# Patient Record
Sex: FEMALE | Race: WHITE | Employment: UNEMPLOYED | URBAN - METROPOLITAN AREA
[De-identification: names, ages, dates, MRNs, and addresses within clinical notes are randomized per-mention and may not be internally consistent; named-entity substitution may affect disease eponyms.]

---
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
## 2020-05-04 LAB
ANTIBODY SCREEN, EXTERNAL: NEGATIVE
HBSAG, EXTERNAL: NEGATIVE
HIV, EXTERNAL: NON REACTIVE
RPR, EXTERNAL: NON REACTIVE
RUBELLA, EXTERNAL: NORMAL

## 2020-05-05 LAB
CHLAMYDIA, EXTERNAL: NEGATIVE
N. GONORRHEA, EXTERNAL: NEGATIVE

## 2020-08-31 ENCOUNTER — HOSPITAL ENCOUNTER (EMERGENCY)
Age: 20
Discharge: HOME | End: 2020-08-31
Attending: EMERGENCY MEDICINE | Admitting: OBSTETRICS & GYNECOLOGY
Payer: MEDICAID

## 2020-08-31 VITALS
TEMPERATURE: 98.3 F | HEART RATE: 98 BPM | RESPIRATION RATE: 20 BRPM | HEIGHT: 62 IN | SYSTOLIC BLOOD PRESSURE: 108 MMHG | OXYGEN SATURATION: 100 % | DIASTOLIC BLOOD PRESSURE: 75 MMHG | WEIGHT: 293 LBS | BODY MASS INDEX: 53.92 KG/M2

## 2020-08-31 DIAGNOSIS — O26.893 VAGINAL DISCHARGE DURING PREGNANCY IN THIRD TRIMESTER: ICD-10-CM

## 2020-08-31 DIAGNOSIS — N89.8 VAGINAL DISCHARGE DURING PREGNANCY IN THIRD TRIMESTER: ICD-10-CM

## 2020-08-31 DIAGNOSIS — O26.893 ABDOMINAL PAIN DURING PREGNANCY IN THIRD TRIMESTER: Primary | ICD-10-CM

## 2020-08-31 DIAGNOSIS — R10.9 ABDOMINAL PAIN DURING PREGNANCY IN THIRD TRIMESTER: Primary | ICD-10-CM

## 2020-08-31 LAB
A1 MICROGLOB PLACENTAL VAG QL: NEGATIVE
ALBUMIN SERPL-MCNC: 2.3 G/DL (ref 3.5–5)
ALBUMIN/GLOB SERPL: 0.5 {RATIO} (ref 1.1–2.2)
ALP SERPL-CCNC: 164 U/L (ref 45–117)
ALT SERPL-CCNC: 24 U/L (ref 12–78)
AMORPH CRY URNS QL MICRO: ABNORMAL
ANION GAP SERPL CALC-SCNC: 6 MMOL/L (ref 5–15)
APPEARANCE UR: ABNORMAL
AST SERPL-CCNC: 13 U/L (ref 15–37)
BACTERIA URNS QL MICRO: NEGATIVE /HPF
BILIRUB SERPL-MCNC: 0.3 MG/DL (ref 0.2–1)
BILIRUB UR QL: NEGATIVE
BUN SERPL-MCNC: 7 MG/DL (ref 6–20)
BUN/CREAT SERPL: 13 (ref 12–20)
CALCIUM SERPL-MCNC: 9.1 MG/DL (ref 8.5–10.1)
CHLORIDE SERPL-SCNC: 106 MMOL/L (ref 97–108)
CO2 SERPL-SCNC: 26 MMOL/L (ref 21–32)
COLOR UR: ABNORMAL
CONTROL LINE PRESENT?: NORMAL
CREAT SERPL-MCNC: 0.54 MG/DL (ref 0.55–1.02)
CREAT UR-MCNC: 87.2 MG/DL
EPITH CASTS URNS QL MICRO: ABNORMAL /LPF
ERYTHROCYTE [DISTWIDTH] IN BLOOD BY AUTOMATED COUNT: 13.2 % (ref 11.5–14.5)
EXPIRATION DATE: NORMAL
GLOBULIN SER CALC-MCNC: 5 G/DL (ref 2–4)
GLUCOSE SERPL-MCNC: 94 MG/DL (ref 65–100)
GLUCOSE UR STRIP.AUTO-MCNC: NEGATIVE MG/DL
HCT VFR BLD AUTO: 34 % (ref 35–47)
HGB BLD-MCNC: 10.9 G/DL (ref 11.5–16)
HGB UR QL STRIP: NEGATIVE
INTERNAL NEGATIVE CONTROL: NORMAL
KETONES UR QL STRIP.AUTO: NEGATIVE MG/DL
KIT LOT NO.: NORMAL
LEUKOCYTE ESTERASE UR QL STRIP.AUTO: NEGATIVE
MCH RBC QN AUTO: 26 PG (ref 26–34)
MCHC RBC AUTO-ENTMCNC: 32.1 G/DL (ref 30–36.5)
MCV RBC AUTO: 81 FL (ref 80–99)
MUCOUS THREADS URNS QL MICRO: ABNORMAL /LPF
NITRITE UR QL STRIP.AUTO: NEGATIVE
NRBC # BLD: 0 K/UL (ref 0–0.01)
NRBC BLD-RTO: 0 PER 100 WBC
PH UR STRIP: 6.5 [PH] (ref 5–8)
PLATELET # BLD AUTO: 324 K/UL (ref 150–400)
PMV BLD AUTO: 9.5 FL (ref 8.9–12.9)
POTASSIUM SERPL-SCNC: 3.7 MMOL/L (ref 3.5–5.1)
PROT SERPL-MCNC: 7.3 G/DL (ref 6.4–8.2)
PROT UR STRIP-MCNC: NEGATIVE MG/DL
PROT UR-MCNC: 15 MG/DL (ref 0–11.9)
PROT/CREAT UR-RTO: 0.2
RBC # BLD AUTO: 4.2 M/UL (ref 3.8–5.2)
RBC #/AREA URNS HPF: ABNORMAL /HPF (ref 0–5)
SODIUM SERPL-SCNC: 138 MMOL/L (ref 136–145)
SP GR UR REFRACTOMETRY: 1.02 (ref 1–1.03)
UA: UC IF INDICATED,UAUC: ABNORMAL
URATE SERPL-MCNC: 3 MG/DL (ref 2.6–6)
UROBILINOGEN UR QL STRIP.AUTO: 1 EU/DL (ref 0.2–1)
WBC # BLD AUTO: 15.9 K/UL (ref 3.6–11)
WBC URNS QL MICRO: ABNORMAL /HPF (ref 0–4)

## 2020-08-31 PROCEDURE — 84550 ASSAY OF BLOOD/URIC ACID: CPT

## 2020-08-31 PROCEDURE — 75810000275 HC EMERGENCY DEPT VISIT NO LEVEL OF CARE

## 2020-08-31 PROCEDURE — 81001 URINALYSIS AUTO W/SCOPE: CPT

## 2020-08-31 PROCEDURE — 59025 FETAL NON-STRESS TEST: CPT

## 2020-08-31 PROCEDURE — 85027 COMPLETE CBC AUTOMATED: CPT

## 2020-08-31 PROCEDURE — 36415 COLL VENOUS BLD VENIPUNCTURE: CPT

## 2020-08-31 PROCEDURE — 84156 ASSAY OF PROTEIN URINE: CPT

## 2020-08-31 PROCEDURE — 80053 COMPREHEN METABOLIC PANEL: CPT

## 2020-08-31 PROCEDURE — 84112 EVAL AMNIOTIC FLUID PROTEIN: CPT | Performed by: OBSTETRICS & GYNECOLOGY

## 2020-08-31 PROCEDURE — 99285 EMERGENCY DEPT VISIT HI MDM: CPT

## 2020-09-01 NOTE — PROGRESS NOTES
NST note  Indication: abd pain, vaginal d/c   FHR = 135 with moderate variability, no decels, + accels  toco = quiet  Time on the monitor = 30 min  Interpretation = Reactive for GA

## 2020-09-01 NOTE — DISCHARGE INSTRUCTIONS
Patient Education        Belly Pain in Pregnancy: Care Instructions  Your Care Instructions     When you're pregnant, any belly pain can be a worry. You may not want to call your doctor about every pain you have. But you don't want to miss something that is dangerous for you or your baby. Even if it feels familiar, belly pain can mean something new when you're pregnant. It's important to know when to call your doctor. It will also help to know how to care for yourself at home when your pain is not caused by anything harmful. · When belly pain is more severe or constant, see a doctor right away. · If you're sure your belly pain is a sign of labor, call your doctor. · When belly pain is brief, it's usually a normal part of pregnancy. It might be related to changes in the growing uterus. Or it could be the stretching of ligaments called round ligaments. These ligaments help support the uterus. Round ligament pain can be on either side of your belly. It can also be felt in your hips or groin. Follow-up care is a key part of your treatment and safety. Be sure to make and go to all appointments, and call your doctor if you are having problems. It's also a good idea to know your test results and keep a list of the medicines you take. How can you tell if belly pain is a sign of labor? When belly pain is caused by labor, it can feel like mild or menstrual-like cramps in your lower belly. These cramps are probably contractions. They can happen in your second or third trimester. You may also have:  · A steady, dull ache in your lower back, pelvis, or thighs. · A feeling of pressure in your pelvis or lower belly. · Changes in your vaginal discharge or a sudden release of fluid from the vagina. If you think you are in labor, call your doctor. How can you care for yourself at home? When belly pain is mild and is not a symptom of labor:  · Rest until you feel better. · Take a warm bath.   · Think about what you drink and eat:  ? Drink plenty of fluids. Choose water and other caffeine-free clear liquids until you feel better. ? Try eating small, frequent meals. If your stomach is upset, try bland, low-fat foods like plain rice, broiled chicken, toast, and yogurt. · Think about how you move if you are having brief pains from stretching of the round ligaments. ? Try gentle stretching. ? Move a little more slowly when turning in bed or getting up from a chair, so those ligaments don't stretch quickly. ? Lean forward a bit if you think you are going to cough or sneeze. When should you call for help? Call 911 anytime you think you may need emergency care. For example, call if:  · You have sudden, severe pain in your belly. · You have severe vaginal bleeding. · You passed out (lost consciousness). · You have a seizure. Call your doctor now or seek immediate medical care if:  · You have new or worse belly pain or cramping. · You have any vaginal bleeding. · You have a fever. · You have symptoms of preeclampsia, such as:  ? Sudden swelling of your face, hands, or feet. ? New vision problems (such as dimness, blurring, or seeing spots). ? A severe headache. · You think that you may be in labor. This means that you've had at least 8 contractions within 1 hour or at least 4 contractions within 20 minutes, even after you change your position and drink fluids. · You have symptoms of a urinary tract infection. These may include:  ? Pain or burning when you urinate. ? A frequent need to urinate without being able to pass much urine. ? Pain in the flank, which is just below the rib cage and above the waist on either side of the back. ? Blood in your urine. Watch closely for changes in your health, and be sure to contact your doctor if you are worried about your or your baby's health. Where can you learn more?   Go to http://davin-alva.info/  Enter B275 in the search box to learn more about \"Belly Pain in Pregnancy: Care Instructions. \"  Current as of: 2020               Content Version: 12.5  © 1001-8785 [employer]. Care instructions adapted under license by [employer] (which disclaims liability or warranty for this information). If you have questions about a medical condition or this instruction, always ask your healthcare professional. [place] [address] any warranty or liability for your use of this information. Patient Education        Weeks 30 to 28 of Your Pregnancy: Care Instructions  Your Care Instructions    You have made it to the final months of your pregnancy. By now, your baby is really starting to look like a baby, with hair and plump skin. As you enter the final weeks of pregnancy, the reality of having a baby may start to set in. This is the time to settle on a name, get your household in order, set up a safe nursery, and find quality  if needed. Doing these things in advance will allow you to focus on caring for and enjoying your new baby. You may also want to have a tour of your hospital's labor and delivery unit to get a better idea of what to expect while you are in the hospital.  During these last months, it is very important to take good care of yourself and pay attention to what your body needs. If your doctor says it is okay for you to work, don't push yourself too hard. Use the tips provided in this care sheet to ease heartburn and care for varicose veins. If you haven't already had the Tdap shot during this pregnancy, talk to your doctor about getting it. It will help protect your  against pertussis infection. Follow-up care is a key part of your treatment and safety. Be sure to make and go to all appointments, and call your doctor if you are having problems. It's also a good idea to know your test results and keep a list of the medicines you take. How can you care for yourself at home?   Pay attention to your baby's movements  · You should feel your baby move several times every day. · Your baby now turns less, and kicks and jabs more. · Your baby sleeps 20 to 45 minutes at a time and is more active at certain times of day. · If your doctor wants you to count your baby's kicks:  ? Empty your bladder, and lie on your side or relax in a comfortable chair. ? Write down your start time. ? Pay attention only to your baby's movements. Count any movement except hiccups. ? After you have counted 10 movements, write down your stop time. ? Write down how many minutes it took for your baby to move 10 times. ? If an hour goes by and you have not recorded 10 movements, have something to eat or drink and then count for another hour. If you do not record 10 movements in either hour, call your doctor. Ease heartburn  · Eat small, frequent meals. · Do not eat chocolate, peppermint, or very spicy foods. Avoid drinks with caffeine, such as coffee, tea, and sodas. · Avoid bending over or lying down after meals. · Talk a short walk after you eat. · If heartburn is a problem at night, do not eat for 2 hours before bedtime. · Take antacids like Mylanta, Maalox, Rolaids, or Tums. Do not take antacids that have sodium bicarbonate. Care for varicose veins  · Varicose veins are blood vessels that stretch out with the extra blood during pregnancy. Your legs may ache or throb. Most varicose veins will go away after the birth. · Avoid standing for long periods of time. Sit with your legs crossed at the ankles, not the knees. · Sit with your feet propped up. · Avoid tight clothing or stockings. Wear support hose. · Exercise regularly. Try walking for at least 30 minutes a day. Where can you learn more? Go to http://www.gray.com/  Enter X471 in the search box to learn more about \"Weeks 30 to 32 of Your Pregnancy: Care Instructions. \"  Current as of: May 29, 2019Content Version: 12.4  © 5916-5620 [employer]. Care instructions adapted under license by [employer] (which disclaims liability or warranty for this information). If you have questions about a medical condition or this instruction, always ask your healthcare professional. [address] any warranty or liability for your use of this information.

## 2020-09-01 NOTE — H&P
CC: Abd pain, vaginal d/c    HPI:  This is a 21yo morbidly obese  @ 30 weeks 1 day who presents with c/o diffuse abdominal pain and watery vaginal discharge    No Known Allergies  No current facility-administered medications on file prior to encounter. Current Outpatient Medications on File Prior to Encounter   Medication Sig Dispense Refill    acetaminophen (TYLENOL) 325 mg tablet Take 650 mg by mouth every four (4) hours as needed for Pain.  PNV66-Iron Fumarate-FA-DSS-DHA 26-1.2- mg cap Take  by mouth.  ibuprofen (MOTRIN) 800 mg tablet Take 1 Tab by mouth every eight (8) hours as needed for Pain. 40 Tab 5    oxyCODONE-acetaminophen (PERCOCET) 5-325 mg per tablet Take 1 Tab by mouth every four (4) hours as needed for Pain. Max Daily Amount: 6 Tabs. 20 Tab 0     Past Medical History:   Diagnosis Date    Psychiatric problem     anxiety, depression, PTSD     Past Surgical History:   Procedure Laterality Date     DELIVERY ONLY      HX  SECTION  2017     OB History        3    Para   1    Term   1            AB   1    Living   1       SAB   1    TAB        Ectopic        Molar        Multiple   0    Live Births   1              Social History     Tobacco Use    Smoking status: Never Smoker    Smokeless tobacco: Never Used   Substance Use Topics    Alcohol use: No    Drug use: No     Family History   Problem Relation Age of Onset    Diabetes Mother     Hypertension Mother     Hypertension Father      Review of Systems   Constitutional: Positive for malaise/fatigue. Negative for chills and fever. HENT: Negative for nosebleeds. Eyes: Negative for blurred vision and double vision. Respiratory: Negative for cough, shortness of breath and wheezing. Cardiovascular: Negative for chest pain, palpitations and leg swelling. Gastrointestinal: Positive for abdominal pain. Negative for diarrhea, heartburn, nausea and vomiting.    Genitourinary: Negative for dysuria and frequency. Musculoskeletal: Negative for joint pain and myalgias. Skin: Negative for itching and rash. Neurological: Positive for dizziness. Negative for headaches. Endo/Heme/Allergies: Does not bruise/bleed easily.      Visit Vitals  BP (!) 151/98 (BP 1 Location: Left arm, BP Patient Position: At rest)   Pulse 68   Temp 98.5 °F (36.9 °C)   Resp 20   Ht 5' 2\" (1.575 m)   Wt 134.4 kg (296 lb 4.8 oz)   SpO2 99%   BMI 54.19 kg/m²     BP: (122-151)/(59-98)     Physical Examination:   General appearance - alert, well appearing, and in no distress, oriented to person, place, and time and normal appearing weight  Mental status - normal mood, behavior, speech, dress, motor activity, and thought processes  Neck - supple, no significant adenopathy  Chest - CTA  Heart - RRR  Abdomen - soft, nontender, nondistended, no masses or organomegaly  Pelvic -   VULVA: normal appearing vulva with no masses, tenderness or lesions,   VAGINA: normal appearing vagina with normal color and discharge, no lesions,   CERVIX: normal appearing cervix without discharge or lesions, L/C  UTERUS: uterus is normal size, shape, consistency and nontender  Neurological - alert, oriented, normal speech, no focal findings or movement disorder noted, cranial nerves II through XII intact, motor and sensory grossly normal bilaterally  Musculoskeletal - full range of motion without pain  Extremities - no pedal edema noted  Skin - normal coloration and turgor, no rashes, no suspicious skin lesions noted    FHR = 135 with moderate variability, no decels, + accels  toco = quiet    Amnisure = negative    A/P 24 yo P1 @ 30w1d with abd pain, vaginal d/c  Reassuring FHT  Reactive NST  No signs of  labor  No signs of PPROM  PIH labs 2dry to elevated initial BP  Dispo based on lab results    Addendum:    Labs wnl  Serial BPs wnl  D/c with OP f/u this week  Precautions reviewed

## 2020-09-01 NOTE — PROGRESS NOTES
9:08 PM  Pt arrived from the ED with complaints of lower left abdominal burning sensation and cramping for the past 3 days and then a sudden pool of fluid around 0900 this morning with some leaking throughout the day since then. Pt also exhibits left flank pain upon touch and reports having diarrhea for the past 3 days. . 30 and 1/7 weeks. Denies vaginal bleeding and reports positive fetal movement. Reports sexual intercourse last night. Pt with recurrent UTIs during this pregnancy. Previous c/s x 1. Will inform Dr Susana Saleem of pt.    9:13 PM  EFM x 2 applied. 9:37 PM  AmniSure test started. 9:47 PM  AmniSure result negative. 9:53 PM  Dr Susana Saleem in with pt. Strip reviewed. Pt removed from EFM by Dr Susana Saleem. Strip reactive and no contractions observed. 9:55 PM  SVE performed by Dr Susana Saleem. Cervix closed/thick/high. 10:53 PM  Lab results reviewed by Dr Susana Saleem. Will discharge pt home. 11:10 PM  Discharge instructions reviewed with pt and her significant other. Pt instructed to call and follow up with her OB provider this week and to inform them of all of her concerns. Pt also instructed to drink more water. Labor precautions reviewed. Pt verbalizes understanding. Summary signed and witnessed. Pt discharged home with her significant other at this time.

## 2020-09-01 NOTE — ED PROVIDER NOTES
EMERGENCY DEPARTMENT HISTORY AND PHYSICAL EXAM      Date: 8/31/2020  Patient Name: Candido Weeks    History of Presenting Illness     Chief Complaint   Patient presents with    Abdominal Pain     Pt reports cramping throughout evening, states she noticed clear discharge this morning, states she felt increased leaking of fluid this evening. Also reports \"burning\" sensation in L side. History Provided By: Patient    HPI: Candido Weeks, 23 y.o. female presents to the ED with cc of abdominal pain and vaginal discharge. Patient states that her pain started last night. It is a burning and cramping sensation which involves the lower abdomen. Pain is currently 6 out of 10 in severity. She is a G3, P1 who is 30 weeks pregnant. She also states that she had some vaginal discharge this evening. She is not sure whether her water broke. It was a clear drainage. She denies nausea, vomiting, chest pain, cough, fever or chills. She denies dysuria or change in bowel habits. She also denies shortness of breath. There are no other complaints, changes, or physical findings at this time. PCP: Unknown, Provider    No current facility-administered medications on file prior to encounter. Current Outpatient Medications on File Prior to Encounter   Medication Sig Dispense Refill    ibuprofen (MOTRIN) 800 mg tablet Take 1 Tab by mouth every eight (8) hours as needed for Pain. 40 Tab 5    oxyCODONE-acetaminophen (PERCOCET) 5-325 mg per tablet Take 1 Tab by mouth every four (4) hours as needed for Pain. Max Daily Amount: 6 Tabs. 20 Tab 0    acetaminophen (TYLENOL) 325 mg tablet Take 650 mg by mouth every four (4) hours as needed for Pain.  PNV66-Iron Fumarate-FA-DSS-DHA 26-1.2- mg cap Take  by mouth.          Past History     Past Medical History:  Past Medical History:   Diagnosis Date    Psychiatric problem     anxiety, depression, PTSD       Past Surgical History:  No past surgical history on file.    Family History:  Family History   Problem Relation Age of Onset    Diabetes Mother     Hypertension Mother     Hypertension Father        Social History:  Social History     Tobacco Use    Smoking status: Never Smoker    Smokeless tobacco: Never Used   Substance Use Topics    Alcohol use: No    Drug use: No       Allergies:  No Known Allergies      Review of Systems   Review of Systems   Constitutional: Negative for fever. HENT: Negative for congestion. Eyes: Negative. Respiratory: Negative for shortness of breath. Cardiovascular: Negative for chest pain. Gastrointestinal: Positive for abdominal pain. Endocrine: Negative for heat intolerance. Genitourinary: Positive for vaginal discharge. Musculoskeletal: Negative for neck pain. Skin: Negative for rash. Allergic/Immunologic: Negative for immunocompromised state. Neurological: Negative for dizziness. Hematological: Does not bruise/bleed easily. Psychiatric/Behavioral: Negative. All other systems reviewed and are negative. Physical Exam   Physical Exam  Vitals signs and nursing note reviewed. Constitutional:       General: She is not in acute distress. Appearance: She is well-developed. HENT:      Head: Normocephalic. Neck:      Musculoskeletal: Normal range of motion and neck supple. Cardiovascular:      Rate and Rhythm: Normal rate and regular rhythm. Heart sounds: Normal heart sounds. Pulmonary:      Effort: Pulmonary effort is normal.      Breath sounds: Normal breath sounds. Abdominal:      General: Bowel sounds are normal.      Palpations: Abdomen is soft. Tenderness: There is abdominal tenderness in the left lower quadrant. Comments: Gravid   Musculoskeletal: Normal range of motion. Skin:     General: Skin is warm and dry. Neurological:      General: No focal deficit present. Mental Status: She is alert.    Psychiatric:         Mood and Affect: Mood normal. Behavior: Behavior normal.         Diagnostic Study Results     Labs -   No results found for this or any previous visit (from the past 12 hour(s)). Radiologic Studies -   No orders to display     CT Results  (Last 48 hours)    None        CXR Results  (Last 48 hours)    None          Medical Decision Making   I am the first provider for this patient. I reviewed the vital signs, available nursing notes, past medical history, past surgical history, family history and social history. Vital Signs-Reviewed the patient's vital signs. Patient Vitals for the past 12 hrs:   Temp Pulse Resp BP SpO2   08/31/20 2100 98.5 °F (36.9 °C) 68 20 (!) 151/98 98 %       . Records Reviewed: Nursing Notes, Old Medical Records and Previous Laboratory Studies    Provider Notes (Medical Decision Making): Abdominal pain in pregnancy, Castro Connell, ruptured membranes UTI    ED Course:   Initial assessment performed. The patients presenting problems have been discussed, and they are in agreement with the care plan formulated and outlined with them. I have encouraged them to ask questions as they arise throughout their visit. Progress note:    Patient is being sent to labor and delivery             Critical Care Time:   0    Disposition:  L & D    DISCHARGE PLAN:  1. Current Discharge Medication List        2. Follow-up Information    None       3. Return to ED if worse     Diagnosis     Clinical Impression:   1. Abdominal pain during pregnancy in third trimester    2. Vaginal discharge during pregnancy in third trimester        Attestations:    Salome Walker MD    Please note that this dictation was completed with [email or web address], the computer voice recognition software. Quite often unanticipated grammatical, syntax, homophones, and other interpretive errors are inadvertently transcribed by the computer software. Please disregard these errors. Please excuse any errors that have escaped final proofreading.   Thank you.

## 2020-09-21 ENCOUNTER — HOSPITAL ENCOUNTER (OUTPATIENT)
Age: 20
Setting detail: OBSERVATION
Discharge: HOME | End: 2020-09-22
Attending: OBSTETRICS & GYNECOLOGY | Admitting: SPECIALIST
Payer: MEDICAID

## 2020-09-21 PROBLEM — O13.9 PREGNANCY INDUCED HYPERTENSION: Status: ACTIVE | Noted: 2020-09-21

## 2020-09-21 LAB
ABO + RH BLD: NORMAL
ALBUMIN SERPL-MCNC: 2.3 G/DL (ref 3.5–5)
ALBUMIN/GLOB SERPL: 0.5 {RATIO} (ref 1.1–2.2)
ALP SERPL-CCNC: 191 U/L (ref 45–117)
ALT SERPL-CCNC: 32 U/L (ref 12–78)
ANION GAP SERPL CALC-SCNC: 7 MMOL/L (ref 5–15)
AST SERPL-CCNC: 16 U/L (ref 15–37)
BASOPHILS # BLD: 0.1 K/UL (ref 0–0.1)
BASOPHILS NFR BLD: 0 % (ref 0–1)
BILIRUB SERPL-MCNC: 0.4 MG/DL (ref 0.2–1)
BLOOD GROUP ANTIBODIES SERPL: NORMAL
BUN SERPL-MCNC: 6 MG/DL (ref 6–20)
BUN/CREAT SERPL: 10 (ref 12–20)
CALCIUM SERPL-MCNC: 9 MG/DL (ref 8.5–10.1)
CHLORIDE SERPL-SCNC: 106 MMOL/L (ref 97–108)
CO2 SERPL-SCNC: 24 MMOL/L (ref 21–32)
CREAT SERPL-MCNC: 0.59 MG/DL (ref 0.55–1.02)
CREAT UR-MCNC: 52.6 MG/DL
DIFFERENTIAL METHOD BLD: ABNORMAL
EOSINOPHIL # BLD: 0.1 K/UL (ref 0–0.4)
EOSINOPHIL NFR BLD: 1 % (ref 0–7)
ERYTHROCYTE [DISTWIDTH] IN BLOOD BY AUTOMATED COUNT: 13.2 % (ref 11.5–14.5)
GLOBULIN SER CALC-MCNC: 5 G/DL (ref 2–4)
GLUCOSE SERPL-MCNC: 79 MG/DL (ref 65–100)
HCT VFR BLD AUTO: 32.6 % (ref 35–47)
HGB BLD-MCNC: 10.4 G/DL (ref 11.5–16)
IMM GRANULOCYTES # BLD AUTO: 0.3 K/UL (ref 0–0.04)
IMM GRANULOCYTES NFR BLD AUTO: 2 % (ref 0–0.5)
LYMPHOCYTES # BLD: 2.2 K/UL (ref 0.8–3.5)
LYMPHOCYTES NFR BLD: 15 % (ref 12–49)
MCH RBC QN AUTO: 24.9 PG (ref 26–34)
MCHC RBC AUTO-ENTMCNC: 31.9 G/DL (ref 30–36.5)
MCV RBC AUTO: 78 FL (ref 80–99)
MONOCYTES # BLD: 1.1 K/UL (ref 0–1)
MONOCYTES NFR BLD: 8 % (ref 5–13)
NEUTS SEG # BLD: 10.7 K/UL (ref 1.8–8)
NEUTS SEG NFR BLD: 74 % (ref 32–75)
NRBC # BLD: 0 K/UL (ref 0–0.01)
NRBC BLD-RTO: 0 PER 100 WBC
PLATELET # BLD AUTO: 295 K/UL (ref 150–400)
PMV BLD AUTO: 9.7 FL (ref 8.9–12.9)
POTASSIUM SERPL-SCNC: 3.9 MMOL/L (ref 3.5–5.1)
PROT SERPL-MCNC: 7.3 G/DL (ref 6.4–8.2)
PROT UR-MCNC: 13 MG/DL (ref 0–11.9)
PROT/CREAT UR-RTO: 0.2
RBC # BLD AUTO: 4.18 M/UL (ref 3.8–5.2)
SODIUM SERPL-SCNC: 137 MMOL/L (ref 136–145)
SPECIMEN EXP DATE BLD: NORMAL
URATE SERPL-MCNC: 3.2 MG/DL (ref 2.6–6)
WBC # BLD AUTO: 14.4 K/UL (ref 3.6–11)

## 2020-09-21 PROCEDURE — 74011250637 HC RX REV CODE- 250/637: Performed by: ADVANCED PRACTICE MIDWIFE

## 2020-09-21 PROCEDURE — 86900 BLOOD TYPING SEROLOGIC ABO: CPT

## 2020-09-21 PROCEDURE — 99218 HC RM OBSERVATION: CPT

## 2020-09-21 PROCEDURE — 84156 ASSAY OF PROTEIN URINE: CPT

## 2020-09-21 PROCEDURE — 84550 ASSAY OF BLOOD/URIC ACID: CPT

## 2020-09-21 PROCEDURE — 80053 COMPREHEN METABOLIC PANEL: CPT

## 2020-09-21 PROCEDURE — 36415 COLL VENOUS BLD VENIPUNCTURE: CPT

## 2020-09-21 PROCEDURE — 59025 FETAL NON-STRESS TEST: CPT

## 2020-09-21 PROCEDURE — 85025 COMPLETE CBC W/AUTO DIFF WBC: CPT

## 2020-09-21 RX ORDER — CALCIUM CARBONATE 200(500)MG
200 TABLET,CHEWABLE ORAL
Status: DISCONTINUED | OUTPATIENT
Start: 2020-09-21 | End: 2020-09-22 | Stop reason: HOSPADM

## 2020-09-21 RX ORDER — ZOLPIDEM TARTRATE 5 MG/1
5 TABLET ORAL
Status: DISCONTINUED | OUTPATIENT
Start: 2020-09-21 | End: 2020-09-22 | Stop reason: HOSPADM

## 2020-09-21 RX ORDER — SODIUM CHLORIDE 0.9 % (FLUSH) 0.9 %
5-40 SYRINGE (ML) INJECTION EVERY 8 HOURS
Status: DISCONTINUED | OUTPATIENT
Start: 2020-09-21 | End: 2020-09-22 | Stop reason: HOSPADM

## 2020-09-21 RX ORDER — ACETAMINOPHEN 325 MG/1
650 TABLET ORAL
Status: DISCONTINUED | OUTPATIENT
Start: 2020-09-21 | End: 2020-09-22 | Stop reason: HOSPADM

## 2020-09-21 RX ORDER — SODIUM CHLORIDE 0.9 % (FLUSH) 0.9 %
SYRINGE (ML) INJECTION
Status: COMPLETED
Start: 2020-09-21 | End: 2020-09-21

## 2020-09-21 RX ORDER — SWAB
1 SWAB, NON-MEDICATED MISCELLANEOUS DAILY
Status: DISCONTINUED | OUTPATIENT
Start: 2020-09-22 | End: 2020-09-22 | Stop reason: HOSPADM

## 2020-09-21 RX ORDER — SODIUM CHLORIDE 0.9 % (FLUSH) 0.9 %
5-40 SYRINGE (ML) INJECTION AS NEEDED
Status: DISCONTINUED | OUTPATIENT
Start: 2020-09-21 | End: 2020-09-22 | Stop reason: HOSPADM

## 2020-09-21 RX ADMIN — Medication 10 ML: at 21:05

## 2020-09-21 RX ADMIN — ACETAMINOPHEN 650 MG: 325 TABLET ORAL at 21:09

## 2020-09-21 NOTE — PROGRESS NOTES
7:15 PM  Bedside shift change report given to Michaela Guillermo RN (oncoming nurse) by Adriana Martell RN (offgoing nurse). Report included the following information SBAR, Kardex, Intake/Output, MAR and Recent Results.

## 2020-09-21 NOTE — H&P
History & Physical    Name: Flaquita Klein MRN: [id number]  SSN: [id number]    YOB: 2000  Age: 23 y.o. Sex: female      Subjective:     Reason for Admission:  Pregnancy and PIH    History of Present Illness: Ms. Minerva Polanco is a 23 y.o.  female with an estimated gestational age of 30w4d with Estimated Date of Delivery: 20. Patient complains of moderate headache  and RUQ pain since this afternoon. She presented to the office today and had elevated blood pressures. Pregnancy has been complicated by morbid obesity. Patient denies visual disturbances. OB History    Para Term  AB Living   3 1 1   1 1   SAB TAB Ectopic Molar Multiple Live Births   1       0 1      # Outcome Date GA Lbr Ernst/2nd Weight Sex Delivery Anes PTL Lv   3 Current            2 Term 17 39w3d  4.12 kg M CS-LTranv SPINAL AN N JOHN PAUL   1 SAB 2016             Past Medical History:   Diagnosis Date    Psychiatric problem     anxiety, depression, PTSD     Past Surgical History:   Procedure Laterality Date     DELIVERY ONLY      HX  SECTION  2017     Social History     Occupational History    Not on file   Tobacco Use    Smoking status: Never Smoker    Smokeless tobacco: Never Used   Substance and Sexual Activity    Alcohol use: No    Drug use: No    Sexual activity: Yes     Partners: Male      Family History   Problem Relation Age of Onset    Diabetes Mother     Hypertension Mother     Hypertension Father        No Known Allergies  Prior to Admission medications    Medication Sig Start Date End Date Taking? Authorizing Provider   acetaminophen (TYLENOL) 325 mg tablet Take 650 mg by mouth every four (4) hours as needed for Pain. Provider, Historical   PNV66-Iron Fumarate-FA-DSS-DHA 26-1.2- mg cap Take  by mouth. Provider, Historical        Review of Systems:  Pertinent items are noted in the History of Present Illness.      Objective:     Vitals:    Vitals:    20 1537 20 1551 20 1552 20 1600   BP: 119/73  119/68    Pulse: (!) 120  (!) 118    Resp: 20      Temp: 98 °F (36.7 °C)      SpO2: 97% 96% 96%    Weight:    136.1 kg (300 lb)   Height:    5' 2\" (1.575 m)      Temp (24hrs), Av °F (36.7 °C), Min:98 °F (36.7 °C), Max:98 °F (36.7 °C)    BP  Min: 119/68  Max: 119/68     Physical Exam:  Patient without distress. Heart: Regular rate and rhythm or S1S2 present  Lung: clear to auscultation throughout lung fields, no wheezes, no rales, no rhonchi and normal respiratory effort  Abdomen: soft, nontender  Fundus: soft and non tender  Lower Extremities:  - Edema 1+     Membranes:  Intact  Uterine Activity:  Irregular cx  Fetal Heart Rate:  Reactive       Lab/Data Review:  Recent Results (from the past 24 hour(s))   CBC WITH AUTOMATED DIFF    Collection Time: 20  3:48 PM   Result Value Ref Range    WBC 14.4 (H) 3.6 - 11.0 K/uL    RBC 4.18 3.80 - 5.20 M/uL    HGB 10.4 (L) 11.5 - 16.0 g/dL    HCT 32.6 (L) 35.0 - 47.0 %    MCV 78.0 (L) 80.0 - 99.0 FL    MCH 24.9 (L) 26.0 - 34.0 PG    MCHC 31.9 30.0 - 36.5 g/dL    RDW 13.2 11.5 - 14.5 %    PLATELET 077 701 - 370 K/uL    MPV 9.7 8.9 - 12.9 FL    NRBC 0.0 0  WBC    ABSOLUTE NRBC 0.00 0.00 - 0.01 K/uL    NEUTROPHILS 74 32 - 75 %    LYMPHOCYTES 15 12 - 49 %    MONOCYTES 8 5 - 13 %    EOSINOPHILS 1 0 - 7 %    BASOPHILS 0 0 - 1 %    IMMATURE GRANULOCYTES 2 (H) 0.0 - 0.5 %    ABS. NEUTROPHILS 10.7 (H) 1.8 - 8.0 K/UL    ABS. LYMPHOCYTES 2.2 0.8 - 3.5 K/UL    ABS. MONOCYTES 1.1 (H) 0.0 - 1.0 K/UL    ABS. EOSINOPHILS 0.1 0.0 - 0.4 K/UL    ABS. BASOPHILS 0.1 0.0 - 0.1 K/UL    ABS. IMM.  GRANS. 0.3 (H) 0.00 - 0.04 K/UL    DF AUTOMATED     METABOLIC PANEL, COMPREHENSIVE    Collection Time: 20  3:48 PM   Result Value Ref Range    Sodium 137 136 - 145 mmol/L    Potassium 3.9 3.5 - 5.1 mmol/L    Chloride 106 97 - 108 mmol/L    CO2 24 21 - 32 mmol/L    Anion gap 7 5 - 15 mmol/L    Glucose 79 65 - 100 mg/dL    BUN 6 6 - 20 MG/DL    Creatinine 0.59 0.55 - 1.02 MG/DL    BUN/Creatinine ratio 10 (L) 12 - 20      GFR est AA >60 >60 ml/min/1.73m2    GFR est non-AA >60 >60 ml/min/1.73m2    Calcium 9.0 8.5 - 10.1 MG/DL    Bilirubin, total 0.4 0.2 - 1.0 MG/DL    ALT (SGPT) 32 12 - 78 U/L    AST (SGOT) 16 15 - 37 U/L    Alk. phosphatase 191 (H) 45 - 117 U/L    Protein, total 7.3 6.4 - 8.2 g/dL    Albumin 2.3 (L) 3.5 - 5.0 g/dL    Globulin 5.0 (H) 2.0 - 4.0 g/dL    A-G Ratio 0.5 (L) 1.1 - 2.2     TYPE & SCREEN    Collection Time: 09/21/20  3:48 PM   Result Value Ref Range    Crossmatch Expiration 09/24/2020     ABO/Rh(D) A POSITIVE     Antibody screen NEG    PROTEIN/CREATININE RATIO, URINE    Collection Time: 09/21/20  3:48 PM   Result Value Ref Range    Protein, urine random 13 (H) 0.0 - 11.9 mg/dL    Creatinine, urine 52.60 mg/dL    Protein/Creat. urine Ratio 0.2     URIC ACID    Collection Time: 09/21/20  3:48 PM   Result Value Ref Range    Uric acid 3.2 2.6 - 6.0 MG/DL       Assessment and Plan: Active Problems:    * No active hospital problems.  *     - Pregnancy-Induced Hypertension:  PIH precautions  Fetal movement instructions  Monitor BPs  24 hour urine  NST

## 2020-09-22 VITALS
SYSTOLIC BLOOD PRESSURE: 126 MMHG | HEART RATE: 99 BPM | TEMPERATURE: 98.2 F | DIASTOLIC BLOOD PRESSURE: 72 MMHG | BODY MASS INDEX: 53.92 KG/M2 | WEIGHT: 293 LBS | OXYGEN SATURATION: 99 % | HEIGHT: 62 IN | RESPIRATION RATE: 16 BRPM

## 2020-09-22 PROBLEM — O13.9 PREGNANCY INDUCED HYPERTENSION: Status: RESOLVED | Noted: 2020-09-21 | Resolved: 2020-09-22

## 2020-09-22 LAB
ALT SERPL-CCNC: 34 U/L (ref 12–78)
AST SERPL-CCNC: 23 U/L (ref 15–37)
BASOPHILS # BLD: 0 K/UL (ref 0–0.1)
BASOPHILS NFR BLD: 0 % (ref 0–1)
BILIRUB SERPL-MCNC: 0.5 MG/DL (ref 0.2–1)
COLLECT DURATION TIME UR: 24 HR
CREAT SERPL-MCNC: 0.54 MG/DL (ref 0.55–1.02)
DIFFERENTIAL METHOD BLD: ABNORMAL
EOSINOPHIL # BLD: 0.2 K/UL (ref 0–0.4)
EOSINOPHIL NFR BLD: 2 % (ref 0–7)
ERYTHROCYTE [DISTWIDTH] IN BLOOD BY AUTOMATED COUNT: 13.4 % (ref 11.5–14.5)
HCT VFR BLD AUTO: 30.9 % (ref 35–47)
HGB BLD-MCNC: 9.8 G/DL (ref 11.5–16)
IMM GRANULOCYTES # BLD AUTO: 0.2 K/UL (ref 0–0.04)
IMM GRANULOCYTES NFR BLD AUTO: 2 % (ref 0–0.5)
LDH SERPL L TO P-CCNC: 167 U/L (ref 81–246)
LYMPHOCYTES # BLD: 2 K/UL (ref 0.8–3.5)
LYMPHOCYTES NFR BLD: 21 % (ref 12–49)
MCH RBC QN AUTO: 24.9 PG (ref 26–34)
MCHC RBC AUTO-ENTMCNC: 31.7 G/DL (ref 30–36.5)
MCV RBC AUTO: 78.6 FL (ref 80–99)
MONOCYTES # BLD: 0.8 K/UL (ref 0–1)
MONOCYTES NFR BLD: 9 % (ref 5–13)
NEUTS SEG # BLD: 6.4 K/UL (ref 1.8–8)
NEUTS SEG NFR BLD: 66 % (ref 32–75)
NRBC # BLD: 0 K/UL (ref 0–0.01)
NRBC BLD-RTO: 0 PER 100 WBC
PLATELET # BLD AUTO: 268 K/UL (ref 150–400)
PMV BLD AUTO: 9.9 FL (ref 8.9–12.9)
PROT 24H UR-MRATE: 295 MG/24HR
RBC # BLD AUTO: 3.93 M/UL (ref 3.8–5.2)
SPECIMEN VOL ?TM UR: 1550 ML
URATE SERPL-MCNC: 3.8 MG/DL (ref 2.6–6)
WBC # BLD AUTO: 9.6 K/UL (ref 3.6–11)

## 2020-09-22 PROCEDURE — 84450 TRANSFERASE (AST) (SGOT): CPT

## 2020-09-22 PROCEDURE — 83615 LACTATE (LD) (LDH) ENZYME: CPT

## 2020-09-22 PROCEDURE — 36415 COLL VENOUS BLD VENIPUNCTURE: CPT

## 2020-09-22 PROCEDURE — 82565 ASSAY OF CREATININE: CPT

## 2020-09-22 PROCEDURE — 85025 COMPLETE CBC W/AUTO DIFF WBC: CPT

## 2020-09-22 PROCEDURE — 84550 ASSAY OF BLOOD/URIC ACID: CPT

## 2020-09-22 PROCEDURE — 74011250637 HC RX REV CODE- 250/637: Performed by: ADVANCED PRACTICE MIDWIFE

## 2020-09-22 PROCEDURE — 84460 ALANINE AMINO (ALT) (SGPT): CPT

## 2020-09-22 PROCEDURE — 99218 HC RM OBSERVATION: CPT

## 2020-09-22 PROCEDURE — 59025 FETAL NON-STRESS TEST: CPT

## 2020-09-22 PROCEDURE — 82247 BILIRUBIN TOTAL: CPT

## 2020-09-22 RX ADMIN — Medication 1 TABLET: at 13:49

## 2020-09-22 RX ADMIN — Medication 10 ML: at 06:49

## 2020-09-22 NOTE — PROGRESS NOTES
High Risk Obstetrics Progress Note    Name: Zoya Melendez MRN: [id number]  SSN: [id number]    YOB: 2000  Age: 23 y.o. Sex: female      Subjective:      LOS: 0 days    Estimated Date of Delivery: 20   Gestational Age Today: 32w3d     Patient admitted to rule out preeclampsia given RUQ and headache yesterday. Patient states the headache and RUQ pain have resolved. She reports good fetal movement and denies contractions, LOF or vaginal bleeding. Objective:     Vitals:  Blood pressure (!) 112/51, pulse 98, temperature 97.8 °F (36.6 °C), resp. rate 18, height 5' 2\" (1.575 m), weight 136.1 kg (300 lb), SpO2 98 %, currently breastfeeding. Temp (24hrs), Av.1 °F (36.7 °C), Min:97.8 °F (36.6 °C), Max:98.2 °F (31.6 °C)    Systolic (05NEA), HKM:588 , Min:100 , CGE:114      Diastolic (51SDC), ZPF:76, Min:51, Max:77       Physical Exam:  General: A&O x 3 in NAD  Cardio: S1 S2 present no g//r  Pulm: CTABL  Abdom: soft, non-tender, non-distended  Extrem: warm, dry, non-tender, negative homans sign, no edema noted   Extremities: +1 pitting edema in the LE B/L  Neruo: +2 patella tendon reflexes no clonus     Membranes:  Intact    Uterine Activity:  None    Fetal Heart Rate:  Reactive        Labs:   Recent Results (from the past 36 hour(s))   CBC WITH AUTOMATED DIFF    Collection Time: 20  3:48 PM   Result Value Ref Range    WBC 14.4 (H) 3.6 - 11.0 K/uL    RBC 4.18 3.80 - 5.20 M/uL    HGB 10.4 (L) 11.5 - 16.0 g/dL    HCT 32.6 (L) 35.0 - 47.0 %    MCV 78.0 (L) 80.0 - 99.0 FL    MCH 24.9 (L) 26.0 - 34.0 PG    MCHC 31.9 30.0 - 36.5 g/dL    RDW 13.2 11.5 - 14.5 %    PLATELET 555 537 - 114 K/uL    MPV 9.7 8.9 - 12.9 FL    NRBC 0.0 0  WBC    ABSOLUTE NRBC 0.00 0.00 - 0.01 K/uL    NEUTROPHILS 74 32 - 75 %    LYMPHOCYTES 15 12 - 49 %    MONOCYTES 8 5 - 13 %    EOSINOPHILS 1 0 - 7 %    BASOPHILS 0 0 - 1 %    IMMATURE GRANULOCYTES 2 (H) 0.0 - 0.5 %    ABS.  NEUTROPHILS 10.7 (H) 1.8 - 8.0 K/UL ABS. LYMPHOCYTES 2.2 0.8 - 3.5 K/UL    ABS. MONOCYTES 1.1 (H) 0.0 - 1.0 K/UL    ABS. EOSINOPHILS 0.1 0.0 - 0.4 K/UL    ABS. BASOPHILS 0.1 0.0 - 0.1 K/UL    ABS. IMM. GRANS. 0.3 (H) 0.00 - 0.04 K/UL    DF AUTOMATED     METABOLIC PANEL, COMPREHENSIVE    Collection Time: 20  3:48 PM   Result Value Ref Range    Sodium 137 136 - 145 mmol/L    Potassium 3.9 3.5 - 5.1 mmol/L    Chloride 106 97 - 108 mmol/L    CO2 24 21 - 32 mmol/L    Anion gap 7 5 - 15 mmol/L    Glucose 79 65 - 100 mg/dL    BUN 6 6 - 20 MG/DL    Creatinine 0.59 0.55 - 1.02 MG/DL    BUN/Creatinine ratio 10 (L) 12 - 20      GFR est AA >60 >60 ml/min/1.73m2    GFR est non-AA >60 >60 ml/min/1.73m2    Calcium 9.0 8.5 - 10.1 MG/DL    Bilirubin, total 0.4 0.2 - 1.0 MG/DL    ALT (SGPT) 32 12 - 78 U/L    AST (SGOT) 16 15 - 37 U/L    Alk. phosphatase 191 (H) 45 - 117 U/L    Protein, total 7.3 6.4 - 8.2 g/dL    Albumin 2.3 (L) 3.5 - 5.0 g/dL    Globulin 5.0 (H) 2.0 - 4.0 g/dL    A-G Ratio 0.5 (L) 1.1 - 2.2     TYPE & SCREEN    Collection Time: 20  3:48 PM   Result Value Ref Range    Crossmatch Expiration 2020     ABO/Rh(D) A POSITIVE     Antibody screen NEG    PROTEIN/CREATININE RATIO, URINE    Collection Time: 20  3:48 PM   Result Value Ref Range    Protein, urine random 13 (H) 0.0 - 11.9 mg/dL    Creatinine, urine 52.60 mg/dL    Protein/Creat. urine Ratio 0.2     URIC ACID    Collection Time: 20  3:48 PM   Result Value Ref Range    Uric acid 3.2 2.6 - 6.0 MG/DL       Assessment and Plan:   Rose Mary Carmona is a 23 y.o. old  at 33w2d   Headache and RUQ pain    Resolved    BP's WNL   Labs WNL aside from a slight elevation of ALT   PC ratio 0.2. Awaiting 24 hour urine.  Will be up at 445 today         Plan:  Repeat labs this AM given slight elevation of ALT  DC home this evening after completion of 24 hour urine   Close outpatient follow up    Signed By: Rain Jackson MD     2020

## 2020-09-22 NOTE — PROGRESS NOTES
7: 07 AM  Bedside shift change report given to Marcela Blair RN (oncoming nurse) by Misty Cope RN (offgoing nurse). Report included the following information SBAR, Kardex, Intake/Output, MAR and Recent Results.

## 2020-10-12 ENCOUNTER — HOSPITAL ENCOUNTER (OUTPATIENT)
Age: 20
Discharge: HOME | End: 2020-10-12
Attending: SPECIALIST | Admitting: SPECIALIST
Payer: MEDICAID

## 2020-10-12 VITALS
SYSTOLIC BLOOD PRESSURE: 134 MMHG | WEIGHT: 293 LBS | OXYGEN SATURATION: 98 % | HEIGHT: 62 IN | BODY MASS INDEX: 53.92 KG/M2 | DIASTOLIC BLOOD PRESSURE: 84 MMHG | TEMPERATURE: 98.3 F | HEART RATE: 125 BPM | RESPIRATION RATE: 18 BRPM

## 2020-10-12 LAB
ALBUMIN SERPL-MCNC: 2.1 G/DL (ref 3.5–5)
ALBUMIN/GLOB SERPL: 0.4 {RATIO} (ref 1.1–2.2)
ALP SERPL-CCNC: 194 U/L (ref 45–117)
ALT SERPL-CCNC: 38 U/L (ref 12–78)
ANION GAP SERPL CALC-SCNC: 7 MMOL/L (ref 5–15)
AST SERPL-CCNC: 20 U/L (ref 15–37)
BILIRUB SERPL-MCNC: 0.3 MG/DL (ref 0.2–1)
BUN SERPL-MCNC: 11 MG/DL (ref 6–20)
BUN/CREAT SERPL: 15 (ref 12–20)
CALCIUM SERPL-MCNC: 8.5 MG/DL (ref 8.5–10.1)
CHLORIDE SERPL-SCNC: 108 MMOL/L (ref 97–108)
CO2 SERPL-SCNC: 23 MMOL/L (ref 21–32)
CREAT SERPL-MCNC: 0.71 MG/DL (ref 0.55–1.02)
CREAT UR-MCNC: 105 MG/DL
ERYTHROCYTE [DISTWIDTH] IN BLOOD BY AUTOMATED COUNT: 14.1 % (ref 11.5–14.5)
GLOBULIN SER CALC-MCNC: 4.9 G/DL (ref 2–4)
GLUCOSE SERPL-MCNC: 125 MG/DL (ref 65–100)
HCT VFR BLD AUTO: 32.5 % (ref 35–47)
HGB BLD-MCNC: 10.5 G/DL (ref 11.5–16)
LDH SERPL L TO P-CCNC: 164 U/L (ref 81–246)
MCH RBC QN AUTO: 24.5 PG (ref 26–34)
MCHC RBC AUTO-ENTMCNC: 32.3 G/DL (ref 30–36.5)
MCV RBC AUTO: 75.8 FL (ref 80–99)
NRBC # BLD: 0 K/UL (ref 0–0.01)
NRBC BLD-RTO: 0 PER 100 WBC
PLATELET # BLD AUTO: 311 K/UL (ref 150–400)
PMV BLD AUTO: 10.4 FL (ref 8.9–12.9)
POTASSIUM SERPL-SCNC: 3.6 MMOL/L (ref 3.5–5.1)
PROT SERPL-MCNC: 7 G/DL (ref 6.4–8.2)
PROT UR-MCNC: 26 MG/DL (ref 0–11.9)
PROT/CREAT UR-RTO: 0.2
RBC # BLD AUTO: 4.29 M/UL (ref 3.8–5.2)
SODIUM SERPL-SCNC: 138 MMOL/L (ref 136–145)
URATE SERPL-MCNC: 3.9 MG/DL (ref 2.6–6)
WBC # BLD AUTO: 13.3 K/UL (ref 3.6–11)

## 2020-10-12 PROCEDURE — 99285 EMERGENCY DEPT VISIT HI MDM: CPT

## 2020-10-12 PROCEDURE — 84550 ASSAY OF BLOOD/URIC ACID: CPT

## 2020-10-12 PROCEDURE — 99218 HC RM OBSERVATION: CPT

## 2020-10-12 PROCEDURE — 84156 ASSAY OF PROTEIN URINE: CPT

## 2020-10-12 PROCEDURE — 80053 COMPREHEN METABOLIC PANEL: CPT

## 2020-10-12 PROCEDURE — 85027 COMPLETE CBC AUTOMATED: CPT

## 2020-10-12 PROCEDURE — 83615 LACTATE (LD) (LDH) ENZYME: CPT

## 2020-10-12 PROCEDURE — 36415 COLL VENOUS BLD VENIPUNCTURE: CPT

## 2020-10-12 NOTE — PROGRESS NOTES
Received pt from home, presented today with elevated BPs with MFM, pt of DR Isbell Ply    2045 Cmp panel and protein.  Cr ratio sent

## 2020-10-13 NOTE — DISCHARGE INSTRUCTIONS
Patient Education        Week 37 of Your Pregnancy: Care Instructions  Your Care Instructions     You are near the end of your pregnancy--and you're probably pretty uncomfortable. It may be harder to walk around. Lying down probably isn't comfortable either. You may have trouble getting to sleep or staying asleep. Most women deliver their babies between 40 and 41 weeks. This is a good time to think about packing a bag for the hospital with items you'll need. Then you'll be ready when labor starts. Follow-up care is a key part of your treatment and safety. Be sure to make and go to all appointments, and call your doctor if you are having problems. It's also a good idea to know your test results and keep a list of the medicines you take. How can you care for yourself at home? Learn about breastfeeding  · Breastfeeding is best for your baby and good for you. · Breast milk has antibodies to help your baby fight infections. · Mothers who breastfeed often lose weight faster, because making milk burns calories. · Learning the best ways to hold your baby will make breastfeeding easier. · Let your partner bathe and diaper the baby to keep your partner from feeling left out. Snuggle together when you breastfeed. · You may want to learn how to use a breast pump and store your milk. · If you choose to bottle feed, make the feeding feel like breastfeeding so you can bond with your baby. Always hold your baby and the bottle. Do not prop bottles or let your baby fall asleep with a bottle. Learn about crying  · It is common for babies to cry for 1 to 3 hours a day. Some cry more, some cry less. · Babies don't cry to make you upset or because you are a bad parent. · Crying is how your baby communicates. Your baby may be hungry; have gas; need a diaper change; or feel cold, warm, tired, lonely, or tense. Sometimes babies cry for unknown reasons.   · If you respond to your baby's needs, he or she will learn to trust you.  · Try to stay calm when your baby cries. Your baby may get more upset if he or she senses that you are upset. Know how to care for your   · Your baby's umbilical cord stump will drop off on its own, usually between 1 and 2 weeks. To care for your baby's umbilical cord area:  ? Clean the area at the bottom of the cord 2 or 3 times a day. ? Pay special attention to the area where the cord attaches to the skin. ? Keep the diaper folded below the cord. ? Use a damp washcloth or cotton ball to sponge bathe your baby until the stump has come off. · Your baby's first dark stool is called meconium. After the meconium is passed, your baby will develop his or her own bowel pattern. ? Some babies, especially  babies, have several bowel movements a day. Others have one or two a day, or one every 2 to 3 days. ?  babies often have loose, yellow stools. Formula-fed babies have more formed stools. ? If your baby's stools look like little pellets, he or she is constipated. After 2 days of constipation, call your baby's doctor. · If your baby will be circumcised, you can care for him at home. ? Gently rinse his penis with warm water after every diaper change. Do not try to remove the film that forms on the penis. This film will go away on its own. Pat dry. ? Put petroleum ointment, such as Vaseline, on the area of the diaper that will touch your baby's penis. This will keep the diaper from sticking to your baby. ? Ask the doctor about giving your baby acetaminophen (Tylenol) for pain. Where can you learn more? Go to http://www.gray.com/  Enter N257 in the search box to learn more about \"Week 37 of Your Pregnancy: Care Instructions. \"  Current as of: 2020               Content Version: 12.6  © 4844-6276 [employer].    Care instructions adapted under license by [employer] (which disclaims liability or warranty for this information). If you have questions about a medical condition or this instruction, always ask your healthcare professional. [place] [address] any warranty or liability for your use of this information.

## 2020-10-13 NOTE — PROGRESS NOTES
Patient presents to triage for after being sent by [facility] many hours ago for elevated BP's in the office. I do not have records of what her BP was. Per patient she had a one time reading of of 150's/80's. Patient does have a headache which she states is \"all over her head\". States she has had this headache since 16 weeks. Patient describes it as mild and declines treatment this evening for it. She takes tylenol from time to time but nothing consistent. She denies RUQ/ Epigastric pain, visual disturbances, swelling, or N/V. She reports good fetal movement. Offered patient to stay over night for monitoring of BP. Patient declines and desires DC home. Visit Vitals  /84   Pulse (!) 125   Temp 98.3 °F (36.8 °C)   Resp 18   Ht 5' 2\" (1.575 m)   Wt 140.6 kg (310 lb)   SpO2 98%   BMI 56.70 kg/m²   General: A&O x 3 in NAD  Cardio: S1 S2 present no g/r  Pulm: CTABL  Abdom: obese, gravid, soft, non-tender  Extrem: warm, dry, non-tender, negative homans sign, no edema noted  Neuro: +1 patella tendon reflexes, no clonus   FHT's: Reactive and reassuring     A:  Roger Hinds is a 23 y.o. old  at 36w1d    Headache      On and off since 16 weeks        Mild, declines treatment. Discussed proper PO intake.  Discussed max dose of tylenol per day       BP's and labs WNL here  Tachycardia       Asymptomatic      Upon review on records patient has had tachycardia between 110's-120's during this pregnancy      Following with [facility]        P:  Offered patient to stay over night for BP monitoring  Patient declinded requesting discharge home   Keep apt with Dr. Kristal Alvarado on Friday   Discussed warning signs and symptoms and reasons to return to the hospital              Signed By: Mildred Smith MD     2020

## 2020-10-26 ENCOUNTER — HOSPITAL ENCOUNTER (INPATIENT)
Age: 20
LOS: 3 days | Discharge: HOME | DRG: 540 | End: 2020-10-29
Attending: SPECIALIST | Admitting: SPECIALIST
Payer: MEDICAID

## 2020-10-26 DIAGNOSIS — Z98.891 S/P CESAREAN SECTION: Primary | ICD-10-CM

## 2020-10-26 PROBLEM — Z34.90 PREGNANCY: Status: ACTIVE | Noted: 2020-10-26

## 2020-10-26 LAB
ABO + RH BLD: NORMAL
BASOPHILS # BLD: 0.1 K/UL (ref 0–0.1)
BASOPHILS NFR BLD: 0 % (ref 0–1)
BLOOD GROUP ANTIBODIES SERPL: NORMAL
DIFFERENTIAL METHOD BLD: ABNORMAL
EOSINOPHIL # BLD: 0.1 K/UL (ref 0–0.4)
EOSINOPHIL NFR BLD: 1 % (ref 0–7)
ERYTHROCYTE [DISTWIDTH] IN BLOOD BY AUTOMATED COUNT: 14.6 % (ref 11.5–14.5)
HCT VFR BLD AUTO: 31.7 % (ref 35–47)
HGB BLD-MCNC: 9.8 G/DL (ref 11.5–16)
IMM GRANULOCYTES # BLD AUTO: 0.2 K/UL (ref 0–0.04)
IMM GRANULOCYTES NFR BLD AUTO: 2 % (ref 0–0.5)
LYMPHOCYTES # BLD: 2.3 K/UL (ref 0.8–3.5)
LYMPHOCYTES NFR BLD: 15 % (ref 12–49)
MCH RBC QN AUTO: 23 PG (ref 26–34)
MCHC RBC AUTO-ENTMCNC: 30.9 G/DL (ref 30–36.5)
MCV RBC AUTO: 74.4 FL (ref 80–99)
MONOCYTES # BLD: 1.3 K/UL (ref 0–1)
MONOCYTES NFR BLD: 8 % (ref 5–13)
NEUTS SEG # BLD: 11.7 K/UL (ref 1.8–8)
NEUTS SEG NFR BLD: 74 % (ref 32–75)
NRBC # BLD: 0 K/UL (ref 0–0.01)
NRBC BLD-RTO: 0 PER 100 WBC
PLATELET # BLD AUTO: 289 K/UL (ref 150–400)
PMV BLD AUTO: 10.2 FL (ref 8.9–12.9)
RBC # BLD AUTO: 4.26 M/UL (ref 3.8–5.2)
SPECIMEN EXP DATE BLD: NORMAL
WBC # BLD AUTO: 15.7 K/UL (ref 3.6–11)

## 2020-10-26 PROCEDURE — 4A1HXCZ MONITORING OF PRODUCTS OF CONCEPTION, CARDIAC RATE, EXTERNAL APPROACH: ICD-10-PCS | Performed by: SPECIALIST

## 2020-10-26 PROCEDURE — 99282 EMERGENCY DEPT VISIT SF MDM: CPT

## 2020-10-26 PROCEDURE — 59025 FETAL NON-STRESS TEST: CPT

## 2020-10-26 PROCEDURE — 86900 BLOOD TYPING SEROLOGIC ABO: CPT

## 2020-10-26 PROCEDURE — 36415 COLL VENOUS BLD VENIPUNCTURE: CPT

## 2020-10-26 PROCEDURE — 85025 COMPLETE CBC W/AUTO DIFF WBC: CPT

## 2020-10-26 PROCEDURE — 65410000002 HC RM PRIVATE OB

## 2020-10-26 RX ORDER — OXYTOCIN/0.9 % SODIUM CHLORIDE 30/500 ML
95 PLASTIC BAG, INJECTION (ML) INTRAVENOUS AS NEEDED
Status: DISCONTINUED | OUTPATIENT
Start: 2020-10-26 | End: 2020-10-27

## 2020-10-26 RX ORDER — SODIUM CHLORIDE, SODIUM LACTATE, POTASSIUM CHLORIDE, CALCIUM CHLORIDE 600; 310; 30; 20 MG/100ML; MG/100ML; MG/100ML; MG/100ML
1000 INJECTION, SOLUTION INTRAVENOUS CONTINUOUS
Status: DISCONTINUED | OUTPATIENT
Start: 2020-10-26 | End: 2020-10-27

## 2020-10-26 RX ORDER — SODIUM CHLORIDE 0.9 % (FLUSH) 0.9 %
5-40 SYRINGE (ML) INJECTION AS NEEDED
Status: DISCONTINUED | OUTPATIENT
Start: 2020-10-26 | End: 2020-10-27

## 2020-10-26 RX ORDER — SODIUM CHLORIDE, SODIUM LACTATE, POTASSIUM CHLORIDE, CALCIUM CHLORIDE 600; 310; 30; 20 MG/100ML; MG/100ML; MG/100ML; MG/100ML
125 INJECTION, SOLUTION INTRAVENOUS CONTINUOUS
Status: DISCONTINUED | OUTPATIENT
Start: 2020-10-27 | End: 2020-10-27

## 2020-10-26 RX ORDER — OXYTOCIN/RINGER'S LACTATE 30/500 ML
10 PLASTIC BAG, INJECTION (ML) INTRAVENOUS AS NEEDED
Status: DISCONTINUED | OUTPATIENT
Start: 2020-10-26 | End: 2020-10-27

## 2020-10-26 RX ORDER — SODIUM CHLORIDE 0.9 % (FLUSH) 0.9 %
5-40 SYRINGE (ML) INJECTION EVERY 8 HOURS
Status: DISCONTINUED | OUTPATIENT
Start: 2020-10-26 | End: 2020-10-27

## 2020-10-26 NOTE — PROGRESS NOTES
1945:  Pt arrives for monitoring (pt states she is to have delivery tonight). PT gowning and leaving urine sample. 2000:  TC to Dr Lennox Ben. Informed him that pt arrived for what nursing was told for fetal monitoring and pt states she is to deliver. Per MD:  Pt to have repeat c/s in am.  Perform NST, if reactive pt may come off monitor and rest for tonight, NPO after mn. If tracing questionable, defer to hospitalist.  C/S scheduled for 0730.    2035:  Transferred to 3180, pt ambulated to room. Oriented to room. 2130:  Eating dinner brought by s/o.    2230:  Pt up to br taking Dial shower. Pubic hair clipped prior to shower. Pt educated on routine for scheduled c/s.      0650:  Dr Rea Vazquez at bs, tracing reviewed. MD will check MFM report from yesterday to see reason for recommended delivery. May cancel case if not indicated. 0367:  Dr Lennox Ben at bs for bs ultrasound to check fluid level. ANNETTE 3.59, delivery indicated per MD.    0725:  SBAR off going report to JOCELYNN White RN. Care turned over.

## 2020-10-27 ENCOUNTER — ANESTHESIA (OUTPATIENT)
Dept: LABOR AND DELIVERY | Age: 20
DRG: 540 | End: 2020-10-27
Payer: MEDICAID

## 2020-10-27 ENCOUNTER — ANESTHESIA EVENT (OUTPATIENT)
Dept: LABOR AND DELIVERY | Age: 20
DRG: 540 | End: 2020-10-27
Payer: MEDICAID

## 2020-10-27 LAB
COVID-19 RAPID TEST, COVR: NOT DETECTED
HEALTH STATUS, XMCV2T: NORMAL
SOURCE, COVRS: NORMAL
SPECIMEN SOURCE, FCOV2M: NORMAL
SPECIMEN TYPE, XMCV1T: NORMAL

## 2020-10-27 PROCEDURE — 74011000250 HC RX REV CODE- 250: Performed by: NURSE ANESTHETIST, CERTIFIED REGISTERED

## 2020-10-27 PROCEDURE — 65410000002 HC RM PRIVATE OB

## 2020-10-27 PROCEDURE — 75410000003 HC RECOV DEL/VAG/CSECN EA 0.5 HR

## 2020-10-27 PROCEDURE — 77030018836 HC SOL IRR NACL ICUM -A

## 2020-10-27 PROCEDURE — 75410000002 HC LABOR FEE PER 1 HR

## 2020-10-27 PROCEDURE — 76060000078 HC EPIDURAL ANESTHESIA

## 2020-10-27 PROCEDURE — 76060000033 HC ANESTHESIA 1 TO 1.5 HR: Performed by: SPECIALIST

## 2020-10-27 PROCEDURE — 76010000392 HC C SECN EA ADDL 0.5 HR: Performed by: SPECIALIST

## 2020-10-27 PROCEDURE — 77030003666 HC NDL SPINAL BD -A: Performed by: ANESTHESIOLOGY

## 2020-10-27 PROCEDURE — 77030007866 HC KT SPN ANES BBMI -B: Performed by: ANESTHESIOLOGY

## 2020-10-27 PROCEDURE — 2709999900 HC NON-CHARGEABLE SUPPLY

## 2020-10-27 PROCEDURE — 74011000250 HC RX REV CODE- 250: Performed by: SPECIALIST

## 2020-10-27 PROCEDURE — 77030002933 HC SUT MCRYL J&J -A

## 2020-10-27 PROCEDURE — 74011250636 HC RX REV CODE- 250/636: Performed by: NURSE ANESTHETIST, CERTIFIED REGISTERED

## 2020-10-27 PROCEDURE — 76060000078 HC EPIDURAL ANESTHESIA: Performed by: SPECIALIST

## 2020-10-27 PROCEDURE — 77030031139 HC SUT VCRL2 J&J -A

## 2020-10-27 PROCEDURE — 77030008459 HC STPLR SKN COOP -B

## 2020-10-27 PROCEDURE — 74011250636 HC RX REV CODE- 250/636: Performed by: SPECIALIST

## 2020-10-27 PROCEDURE — 87635 SARS-COV-2 COVID-19 AMP PRB: CPT

## 2020-10-27 PROCEDURE — 36415 COLL VENOUS BLD VENIPUNCTURE: CPT

## 2020-10-27 PROCEDURE — 74011250637 HC RX REV CODE- 250/637: Performed by: SPECIALIST

## 2020-10-27 PROCEDURE — 77030005513 HC CATH URETH FOL11 MDII -B

## 2020-10-27 PROCEDURE — 76010000391 HC C SECN FIRST 1 HR: Performed by: SPECIALIST

## 2020-10-27 PROCEDURE — 74011000258 HC RX REV CODE- 258: Performed by: SPECIALIST

## 2020-10-27 PROCEDURE — 77010026065 HC OXYGEN MINIMUM MEDICAL AIR

## 2020-10-27 RX ORDER — NALOXONE HYDROCHLORIDE 0.4 MG/ML
0.4 INJECTION, SOLUTION INTRAMUSCULAR; INTRAVENOUS; SUBCUTANEOUS AS NEEDED
Status: DISCONTINUED | OUTPATIENT
Start: 2020-10-27 | End: 2020-10-29 | Stop reason: HOSPADM

## 2020-10-27 RX ORDER — ONDANSETRON 2 MG/ML
4 INJECTION INTRAMUSCULAR; INTRAVENOUS
Status: DISCONTINUED | OUTPATIENT
Start: 2020-10-27 | End: 2020-10-29 | Stop reason: HOSPADM

## 2020-10-27 RX ORDER — KETOROLAC TROMETHAMINE 30 MG/ML
15 INJECTION, SOLUTION INTRAMUSCULAR; INTRAVENOUS
Status: DISCONTINUED | OUTPATIENT
Start: 2020-10-28 | End: 2020-10-27

## 2020-10-27 RX ORDER — NALOXONE HYDROCHLORIDE 0.4 MG/ML
0.4 INJECTION, SOLUTION INTRAMUSCULAR; INTRAVENOUS; SUBCUTANEOUS AS NEEDED
Status: DISCONTINUED | OUTPATIENT
Start: 2020-10-27 | End: 2020-10-27

## 2020-10-27 RX ORDER — PHENYLEPHRINE HCL IN 0.9% NACL 0.4MG/10ML
SYRINGE (ML) INTRAVENOUS AS NEEDED
Status: DISCONTINUED | OUTPATIENT
Start: 2020-10-27 | End: 2020-10-27 | Stop reason: HOSPADM

## 2020-10-27 RX ORDER — BUPIVACAINE HYDROCHLORIDE 7.5 MG/ML
INJECTION, SOLUTION EPIDURAL; RETROBULBAR AS NEEDED
Status: DISCONTINUED | OUTPATIENT
Start: 2020-10-27 | End: 2020-10-27 | Stop reason: HOSPADM

## 2020-10-27 RX ORDER — OXYTOCIN 10 [USP'U]/ML
INJECTION, SOLUTION INTRAMUSCULAR; INTRAVENOUS AS NEEDED
Status: DISCONTINUED | OUTPATIENT
Start: 2020-10-27 | End: 2020-10-27 | Stop reason: HOSPADM

## 2020-10-27 RX ORDER — OXYTOCIN/RINGER'S LACTATE 30/500 ML
95 PLASTIC BAG, INJECTION (ML) INTRAVENOUS AS NEEDED
Status: COMPLETED | OUTPATIENT
Start: 2020-10-27 | End: 2020-10-27

## 2020-10-27 RX ORDER — ZOLPIDEM TARTRATE 5 MG/1
5 TABLET ORAL
Status: DISCONTINUED | OUTPATIENT
Start: 2020-10-27 | End: 2020-10-29 | Stop reason: HOSPADM

## 2020-10-27 RX ORDER — IBUPROFEN 600 MG/1
600 TABLET ORAL
Status: DISCONTINUED | OUTPATIENT
Start: 2020-10-27 | End: 2020-10-29 | Stop reason: HOSPADM

## 2020-10-27 RX ORDER — MISOPROSTOL 100 UG/1
TABLET ORAL AS NEEDED
Status: DISCONTINUED | OUTPATIENT
Start: 2020-10-27 | End: 2020-10-29 | Stop reason: HOSPADM

## 2020-10-27 RX ORDER — MORPHINE SULFATE 0.5 MG/ML
INJECTION, SOLUTION EPIDURAL; INTRATHECAL; INTRAVENOUS AS NEEDED
Status: DISCONTINUED | OUTPATIENT
Start: 2020-10-27 | End: 2020-10-27 | Stop reason: HOSPADM

## 2020-10-27 RX ORDER — OXYTOCIN/RINGER'S LACTATE 30/500 ML
10 PLASTIC BAG, INJECTION (ML) INTRAVENOUS AS NEEDED
Status: DISCONTINUED | OUTPATIENT
Start: 2020-10-27 | End: 2020-10-29 | Stop reason: HOSPADM

## 2020-10-27 RX ORDER — KETOROLAC TROMETHAMINE 30 MG/ML
30 INJECTION, SOLUTION INTRAMUSCULAR; INTRAVENOUS
Status: ACTIVE | OUTPATIENT
Start: 2020-10-27 | End: 2020-10-28

## 2020-10-27 RX ORDER — DIPHENHYDRAMINE HYDROCHLORIDE 50 MG/ML
25-50 INJECTION, SOLUTION INTRAMUSCULAR; INTRAVENOUS
Status: DISCONTINUED | OUTPATIENT
Start: 2020-10-27 | End: 2020-10-27

## 2020-10-27 RX ORDER — IBUPROFEN 400 MG/1
800 TABLET ORAL EVERY 8 HOURS
Status: DISCONTINUED | OUTPATIENT
Start: 2020-10-27 | End: 2020-10-29 | Stop reason: HOSPADM

## 2020-10-27 RX ORDER — SODIUM CHLORIDE 0.9 % (FLUSH) 0.9 %
5-40 SYRINGE (ML) INJECTION AS NEEDED
Status: DISCONTINUED | OUTPATIENT
Start: 2020-10-27 | End: 2020-10-29 | Stop reason: HOSPADM

## 2020-10-27 RX ORDER — SIMETHICONE 80 MG
80 TABLET,CHEWABLE ORAL AS NEEDED
Status: DISCONTINUED | OUTPATIENT
Start: 2020-10-27 | End: 2020-10-29 | Stop reason: HOSPADM

## 2020-10-27 RX ORDER — SODIUM CHLORIDE 0.9 % (FLUSH) 0.9 %
5-40 SYRINGE (ML) INJECTION EVERY 8 HOURS
Status: DISCONTINUED | OUTPATIENT
Start: 2020-10-27 | End: 2020-10-29 | Stop reason: HOSPADM

## 2020-10-27 RX ORDER — OXYCODONE AND ACETAMINOPHEN 5; 325 MG/1; MG/1
1 TABLET ORAL
Status: DISCONTINUED | OUTPATIENT
Start: 2020-10-27 | End: 2020-10-29 | Stop reason: HOSPADM

## 2020-10-27 RX ORDER — MISOPROSTOL 200 UG/1
TABLET ORAL
Status: DISPENSED
Start: 2020-10-27 | End: 2020-10-27

## 2020-10-27 RX ORDER — ONDANSETRON 2 MG/ML
INJECTION INTRAMUSCULAR; INTRAVENOUS AS NEEDED
Status: DISCONTINUED | OUTPATIENT
Start: 2020-10-27 | End: 2020-10-27 | Stop reason: HOSPADM

## 2020-10-27 RX ORDER — DIPHENHYDRAMINE HYDROCHLORIDE 50 MG/ML
12.5 INJECTION, SOLUTION INTRAMUSCULAR; INTRAVENOUS
Status: DISCONTINUED | OUTPATIENT
Start: 2020-10-27 | End: 2020-10-29 | Stop reason: HOSPADM

## 2020-10-27 RX ORDER — ONDANSETRON 2 MG/ML
4 INJECTION INTRAMUSCULAR; INTRAVENOUS
Status: DISCONTINUED | OUTPATIENT
Start: 2020-10-27 | End: 2020-10-27

## 2020-10-27 RX ORDER — SODIUM CHLORIDE, SODIUM LACTATE, POTASSIUM CHLORIDE, CALCIUM CHLORIDE 600; 310; 30; 20 MG/100ML; MG/100ML; MG/100ML; MG/100ML
150 INJECTION, SOLUTION INTRAVENOUS CONTINUOUS
Status: DISCONTINUED | OUTPATIENT
Start: 2020-10-27 | End: 2020-10-29 | Stop reason: HOSPADM

## 2020-10-27 RX ADMIN — SODIUM CHLORIDE, SODIUM LACTATE, POTASSIUM CHLORIDE, AND CALCIUM CHLORIDE 125 ML/HR: 600; 310; 30; 20 INJECTION, SOLUTION INTRAVENOUS at 07:18

## 2020-10-27 RX ADMIN — OXYTOCIN 95 MILLI-UNITS/MIN: 10 INJECTION, SOLUTION INTRAMUSCULAR; INTRAVENOUS at 11:17

## 2020-10-27 RX ADMIN — SODIUM CHLORIDE, SODIUM LACTATE, POTASSIUM CHLORIDE, AND CALCIUM CHLORIDE 150 ML/HR: 600; 310; 30; 20 INJECTION, SOLUTION INTRAVENOUS at 16:36

## 2020-10-27 RX ADMIN — IBUPROFEN 800 MG: 400 TABLET, FILM COATED ORAL at 23:32

## 2020-10-27 RX ADMIN — BUPIVACAINE HYDROCHLORIDE 10.5 MG: 7.5 INJECTION, SOLUTION EPIDURAL; RETROBULBAR at 08:50

## 2020-10-27 RX ADMIN — OXYTOCIN 20 UNITS: 10 INJECTION, SOLUTION INTRAMUSCULAR; INTRAVENOUS at 09:11

## 2020-10-27 RX ADMIN — MORPHINE SULFATE 250 MCG: 0.5 INJECTION, SOLUTION EPIDURAL; INTRATHECAL; INTRAVENOUS at 08:50

## 2020-10-27 RX ADMIN — Medication 5 ML: at 00:11

## 2020-10-27 RX ADMIN — SODIUM CHLORIDE, SODIUM LACTATE, POTASSIUM CHLORIDE, AND CALCIUM CHLORIDE 150 ML/HR: 600; 310; 30; 20 INJECTION, SOLUTION INTRAVENOUS at 23:32

## 2020-10-27 RX ADMIN — ONDANSETRON HYDROCHLORIDE 4 MG: 2 INJECTION, SOLUTION INTRAMUSCULAR; INTRAVENOUS at 09:02

## 2020-10-27 RX ADMIN — Medication 80 MCG: at 09:07

## 2020-10-27 RX ADMIN — IBUPROFEN 800 MG: 400 TABLET, FILM COATED ORAL at 16:37

## 2020-10-27 RX ADMIN — CEFAZOLIN SODIUM 3 G: 10 INJECTION, POWDER, FOR SOLUTION INTRAVENOUS at 08:27

## 2020-10-27 NOTE — ANESTHESIA POSTPROCEDURE EVALUATION
Procedure(s):   SECTION. spinal    Anesthesia Post Evaluation        Patient location during evaluation: PACU  Note status: Adequate. Level of consciousness: responsive to verbal stimuli and sleepy but conscious  Pain management: satisfactory to patient  Airway patency: patent  Anesthetic complications: no  Cardiovascular status: acceptable  Respiratory status: acceptable  Hydration status: acceptable  Comments: +Post-Anesthesia Evaluation and Assessment    Patient: Brendan Banks MRN: [id number]  SSN: [id number]   YOB: 2000  Age: 23 y.o. Sex: female      Cardiovascular Function/Vital Signs    /69   Pulse 81   Temp 36.6 °C (97.9 °F)   Resp 22   Ht 5' 2\" (1.575 m)   Wt 139.3 kg (307 lb)   SpO2 100%   Breastfeeding Yes   BMI 56.15 kg/m²     Patient is status post Procedure(s):   SECTION. Nausea/Vomiting: Controlled. Postoperative hydration reviewed and adequate. Pain:  Pain Scale 1: Numeric (0 - 10) (10/27/20 1000)  Pain Intensity 1: 0 (10/27/20 1000)   Managed. Neurological Status:   Neuro (WDL): Within Defined Limits (10/27/20 1000)   At baseline. Mental Status and Level of Consciousness: Arousable. Pulmonary Status:   O2 Device: Room air (10/27/20 1000)   Adequate oxygenation and airway patent. Complications related to anesthesia: None    Post-anesthesia assessment completed. No concerns. Signed By: Jeannie Haskins MD    10/27/2020  Post anesthesia nausea and vomiting:  controlled      INITIAL Post-op Vital signs:   Vitals Value Taken Time   /69 10/27/2020 10:24 AM   Temp     Pulse 81 10/27/2020 10:24 AM   Resp     SpO2 100 % 10/27/2020 10:31 AM   Vitals shown include unvalidated device data.

## 2020-10-27 NOTE — ANESTHESIA PROCEDURE NOTES
Spinal Block    Start time: 10/27/2020 8:46 AM  End time: 10/27/2020 8:50 AM  Performed by: Nancy Medrano MD  Authorized by: Nancy Medrano MD     Pre-procedure: Indications: primary anesthetic  Preanesthetic Checklist: risks and benefits discussed, site marked and timeout performed    Timeout Time: 08:46          Spinal Block:   Patient Position:  Seated  Prep Region:  Lumbar  Prep: DuraPrep      Location:  L3-4  Technique:  Single shot        Needle:   Needle Type:  Pencil-tip  Needle Gauge:  25 G  Attempts:  1      Events: CSF confirmed and no blood with aspiration        Assessment:  Insertion:  Uncomplicated  Patient tolerance:  Patient tolerated the procedure well with no immediate complications  1.4 mL 8.19% Spinal Marcaine with dextrose + 0.25 mg Duramorph was deposited into the CSF.

## 2020-10-27 NOTE — ANESTHESIA PREPROCEDURE EVALUATION
Anesthetic History   No history of anesthetic complications            Review of Systems / Medical History  Patient summary reviewed, nursing notes reviewed and pertinent labs reviewed    Pulmonary  Within defined limits                 Neuro/Psych         Psychiatric history    Comments: Anxiety  Depression  PTSD Cardiovascular    Hypertension              Exercise tolerance: <4 METS  Comments: ECG (6/23/17):   Sinus tachycardia   Otherwise normal ECG   No previous ECGs available    GI/Hepatic/Renal  Within defined limits              Endo/Other        Morbid obesity and anemia     Other Findings   Comments: IUP - Oligohydramnios         Physical Exam    Airway  Mallampati: II  TM Distance: 4 - 6 cm  Neck ROM: normal range of motion   Mouth opening: Normal     Cardiovascular  Regular rate and rhythm,  S1 and S2 normal,  no murmur, click, rub, or gallop             Dental  No notable dental hx       Pulmonary  Breath sounds clear to auscultation               Abdominal  GI exam deferred       Other Findings            Anesthetic Plan    ASA: 3  Anesthesia type: spinal      Post-op pain plan if not by surgeon: intrathecal opiates      Anesthetic plan and risks discussed with: Patient

## 2020-10-27 NOTE — OP NOTES
[address]  OPERATIVE REPORT    Name:  Jono Singer  MR#:  [id number]  :  2000  ACCOUNT #:  [de-identified]  DATE OF SERVICE:  10/27/2020    PREOPERATIVE DIAGNOSES:  Previous  section, 38 weeks, intrauterine pregnancy, oligohydramnios. POSTOPERATIVE DIAGNOSES:  Previous  section, 38 weeks, intrauterine pregnancy, oligohydramnios. PROCEDURE PERFORMED:  Repeat low-transverse  section. SURGEON:  Adrien Quiñonez MD    ASSISTANT:  Antonio Sloan MD    ANESTHESIA:  Spinal.    COMPLICATIONS:  None. SPECIMENS REMOVED:  None. IMPLANTS:  None. ESTIMATED BLOOD LOSS:  700 mL. FINDINGS:  There was delivery of a viable male infant weighing 9 pounds 6 ounces. The uterus, tubes, ovaries, and placenta all appeared grossly normal.    DESCRIPTION OF PROCEDURE:  The patient was taken to the operating room and placed on the operating room table. After adequate anesthesia was obtained, she was placed in the left lateral tilt position. A Rich catheter was placed. The abdomen was prepped. The patient was draped in the usual sterile fashion. A low-transverse incision was made on the skin with a knife, and carried down to the fascia with the knife. The fascia was nicked in the midline and the fascial incision was extended bilaterally using Rodriguez scissors. The rectus muscles were  bluntly in the midline and the peritoneum was entered bluntly. A bladder flap was created and a low-transverse incision was made on the uterus with Metzenbaum scissors. This incision was extended bilaterally using blunt dissection. The head was delivered without difficulty as well as the shoulder and rest of the body. The cord was clamp and cut, and the infant was handed to awaiting personnel. The uterus was exteriorized and the uterine cavity was swept with a moist laparotomy sponge. The uterine incision was closed using a running interlocking suture of 0 Vicryl.   The uterus was placed back into the pelvis. There was bleeding at the midline of the uterine incision that was controlled with a figure-of-eight suture of 0 Vicryl. Hemostasis was assured. Attention was turned to the peritoneum, which was closed with a running suture of 2-0 Vicryl. The fascia was closed using a running suture of #1 Vicryl. Subcutaneous layer was irrigated and reapproximated using 2-0 plain. The skin was closed using a running subcuticular suture of 4-0 Monocryl. There was clear urine draining from the Rich at the end of procedure. All sponge, needle, and instrument counts were correct x2 at the end of procedure. The patient tolerated the procedure well and was taken to the recovery area in Labor and Delivery in satisfactory condition.         MD HEMALATHA [id number]  D:  10/27/2020 9:49  T:  10/27/2020 12:15  JOB #:  [id number]

## 2020-10-27 NOTE — ROUTINE PROCESS
Bedside and Verbal shift change report given to SALBADOR De La Torre RN (oncoming nurse) by VEL Tate RN (offgoing nurse). Report included the following information SBAR, Procedure Summary, Intake/Output and MAR.

## 2020-10-27 NOTE — PROGRESS NOTES
PN  Pt c/o leaking. ulrasound shows ANNETTE 3.59cm    Will proceed with RLTCS due to oligohydramnios.

## 2020-10-27 NOTE — H&P
History & Physical    Name: Brendan Banks MRN: [id number]  SSN: [id number]    YOB: 2000  Age: 23 y.o. Sex: female      Subjective:     Estimated Date of Delivery: 20  OB History    Para Term  AB Living   3 2 2   1 2   SAB TAB Ectopic Molar Multiple Live Births   1       0 2      # Outcome Date GA Lbr Ernst/2nd Weight Sex Delivery Anes PTL Lv   3 Term 10/27/20 38w2d  4.24 kg M CS-LTranv Spinal N JOHN PAUL   2 Term 17 39w3d  4.12 kg M CS-LTranv SPINAL AN N JOHN PAUL   1 SAB 2016               Ms. Elvira Muñoz admitted with pregnancy at 38w2d for  section due to oligo, ANNETTE 3.59cm, previous c/s. Prenatal course was normal. Please see prenatal records for details. Past Medical History:   Diagnosis Date    Pregnancy induced hypertension 2020    Psychiatric problem     anxiety, depression, PTSD     Past Surgical History:   Procedure Laterality Date     DELIVERY ONLY      HX  SECTION  2017     Social History     Occupational History    Not on file   Tobacco Use    Smoking status: Never Smoker    Smokeless tobacco: Never Used   Substance and Sexual Activity    Alcohol use: No    Drug use: No    Sexual activity: Yes     Partners: Male     Family History   Problem Relation Age of Onset    Diabetes Mother     Hypertension Mother     Hypertension Father        No Known Allergies  Prior to Admission medications    Medication Sig Start Date End Date Taking? Authorizing Provider   acetaminophen (TYLENOL) 325 mg tablet Take 650 mg by mouth every four (4) hours as needed for Pain. Yes Provider, Historical   PNV66-Iron Fumarate-FA-DSS-DHA 26-1.2- mg cap Take  by mouth. Yes Provider, Historical        Review of Systems: A comprehensive review of systems was negative except for that written in the History of Present Illness.     Objective:     Vitals:  Vitals:    10/27/20 1124 10/27/20 1126 10/27/20 1139 10/27/20 1141   BP: 133/79  127/74    Pulse: 89 94    Resp:       Temp:       SpO2:  99%  98%   Weight:       Height:            Physical Exam:  Deferred  Membranes:  Intact  Fetal Heart Rate: Reactive    Prenatal Labs:   Lab Results   Component Value Date/Time    ABO/Rh(D) A POSITIVE 10/26/2020 09:06 PM    Rubella, External 2.54 - Immune 2020    GrBStrep, External Negative 2017    HBsAg, External Negative 2020    HIV, External Non Reactive 2020    RPR, External Non Reactive 2020    Gonorrhea, External Negative 2020    Chlamydia, External Negative 2020    ABO,Rh A pos 2017         Impression/Plan:     Plan:  Admit for  section. Group B Strep was see prenatal. Discussed the risks of surgery including the risks of bleeding, infection, deep vein thrombosis, and surgical injuries to internal organs including but not limited to the bowels, bladder, rectum, and female reproductive organs. The patient understands the risks; any and all questions were answered to the patient's satisfaction.

## 2020-10-27 NOTE — PROGRESS NOTES
0820: Bedside shift change report given to DEACON Wang RN (oncoming nurse) by B. Severa Mellow (offgoing nurse). Report included the following information SBAR and Kardex. 1120: Dr. Drea Schwartz notified of pt BP. RN will continue to watch. Dr. Drea Schwartz reminded to put in H&P note. 1205: TRANSFER - OUT REPORT:    Verbal report given to Izaiah Del Toro RN(name) on [facility]  being transferred to MIU (unit) for routine progression of care       Report consisted of patients Situation, Background, Assessment and   Recommendations(SBAR). Information from the following report(s) SBAR, Kardex, OR Summary, Intake/Output and MAR was reviewed with the receiving nurse. Lines:   Peripheral IV 10/26/20 Left;Posterior Hand (Active)   Site Assessment Clean, dry, & intact 10/27/20 0820   Phlebitis Assessment 0 10/27/20 0820   Infiltration Assessment 0 10/27/20 0820   Dressing Status Clean, dry, & intact 10/27/20 0820   Dressing Type Transparent;Tape 10/27/20 0820        Opportunity for questions and clarification was provided. Patient transported with:   Registered Nurse     [phone]: Bedside shift change report given to DEACON Wang RN (oncoming nurse) by Eleni Leonard. Amanda Marques RN (offgoing nurse). Report included the following information SBAR and Kardex. 1915: Bedside shift change report given to SALBADOR De La Torre RN (oncoming nurse) by DEACON Wang RN (offgoing nurse). Report included the following information SBAR and Kardex.

## 2020-10-27 NOTE — ROUTINE PROCESS
Verbal shift change report given to VEL Wang RN (oncoming nurse) by Peter Maxwell. Roverto Reza RN (offgoing nurse). Report included the following information SBAR, Kardex, Intake/Output, MAR, and Recent Results.

## 2020-10-28 LAB
BASOPHILS # BLD: 0 K/UL (ref 0–0.1)
BASOPHILS NFR BLD: 0 % (ref 0–1)
DIFFERENTIAL METHOD BLD: ABNORMAL
EOSINOPHIL # BLD: 0.2 K/UL (ref 0–0.4)
EOSINOPHIL NFR BLD: 1 % (ref 0–7)
ERYTHROCYTE [DISTWIDTH] IN BLOOD BY AUTOMATED COUNT: 14.6 % (ref 11.5–14.5)
HCT VFR BLD AUTO: 26.3 % (ref 35–47)
HGB BLD-MCNC: 8 G/DL (ref 11.5–16)
IMM GRANULOCYTES # BLD AUTO: 0.1 K/UL (ref 0–0.04)
IMM GRANULOCYTES NFR BLD AUTO: 1 % (ref 0–0.5)
LYMPHOCYTES # BLD: 2.5 K/UL (ref 0.8–3.5)
LYMPHOCYTES NFR BLD: 21 % (ref 12–49)
MCH RBC QN AUTO: 22.7 PG (ref 26–34)
MCHC RBC AUTO-ENTMCNC: 30.4 G/DL (ref 30–36.5)
MCV RBC AUTO: 74.5 FL (ref 80–99)
MONOCYTES # BLD: 1.3 K/UL (ref 0–1)
MONOCYTES NFR BLD: 11 % (ref 5–13)
NEUTS SEG # BLD: 7.8 K/UL (ref 1.8–8)
NEUTS SEG NFR BLD: 66 % (ref 32–75)
NRBC # BLD: 0 K/UL (ref 0–0.01)
NRBC BLD-RTO: 0 PER 100 WBC
PLATELET # BLD AUTO: 234 K/UL (ref 150–400)
PMV BLD AUTO: 9.9 FL (ref 8.9–12.9)
RBC # BLD AUTO: 3.53 M/UL (ref 3.8–5.2)
WBC # BLD AUTO: 11.8 K/UL (ref 3.6–11)

## 2020-10-28 PROCEDURE — 65410000002 HC RM PRIVATE OB

## 2020-10-28 PROCEDURE — 74011250637 HC RX REV CODE- 250/637: Performed by: SPECIALIST

## 2020-10-28 PROCEDURE — 74011250637 HC RX REV CODE- 250/637

## 2020-10-28 PROCEDURE — 36415 COLL VENOUS BLD VENIPUNCTURE: CPT

## 2020-10-28 PROCEDURE — 85025 COMPLETE CBC W/AUTO DIFF WBC: CPT

## 2020-10-28 RX ORDER — ACETAMINOPHEN 325 MG/1
TABLET ORAL
Status: COMPLETED
Start: 2020-10-28 | End: 2020-10-28

## 2020-10-28 RX ORDER — ACETAMINOPHEN 325 MG/1
650 TABLET ORAL
Status: DISCONTINUED | OUTPATIENT
Start: 2020-10-28 | End: 2020-10-29 | Stop reason: HOSPADM

## 2020-10-28 RX ORDER — DOCUSATE SODIUM 100 MG/1
100 CAPSULE, LIQUID FILLED ORAL
Status: DISCONTINUED | OUTPATIENT
Start: 2020-10-28 | End: 2020-10-29 | Stop reason: HOSPADM

## 2020-10-28 RX ADMIN — Medication 80 MG: at 15:11

## 2020-10-28 RX ADMIN — ACETAMINOPHEN 650 MG: 325 TABLET ORAL at 13:51

## 2020-10-28 RX ADMIN — DOCUSATE SODIUM 100 MG: 100 CAPSULE, LIQUID FILLED ORAL at 15:11

## 2020-10-28 RX ADMIN — IBUPROFEN 800 MG: 400 TABLET, FILM COATED ORAL at 07:45

## 2020-10-28 RX ADMIN — IBUPROFEN 800 MG: 400 TABLET, FILM COATED ORAL at 23:15

## 2020-10-28 RX ADMIN — IBUPROFEN 800 MG: 400 TABLET, FILM COATED ORAL at 15:11

## 2020-10-28 NOTE — ROUTINE PROCESS
Shift change report given to JIM Johnson RN/ROLANDO De La Torre RN (oncoming nurse) by Juni Dickinson RN (offgoing nurse). Report included the following information SBAR.

## 2020-10-28 NOTE — PROGRESS NOTES
Duramorph Follow-Up Note    1 Day Post-Op sp Procedure(s):   SECTION. BP (!) 120/56   Pulse 88   Temp 36.8 °C (98.3 °F)   Resp 18   Ht 5' 2\" (1.575 m)   Wt 139.3 kg (307 lb)   SpO2 98%   Breastfeeding Yes   BMI 56.15 kg/m² . Patient is POD-1 S/P intrathecal duramorph. Pain is well controlled  Patient reports no headache, fever, weakness or numbness. Epidural/spinal tap site is clean, dry and intact. No obvious Anesthesia complications noted. Plan:    Pain management as per primary service.

## 2020-10-28 NOTE — ROUTINE PROCESS
Bedside and Verbal shift change report given to SUKHWINDER Roberts RN (oncoming nurse) by Inder Middleton RN (offgoing nurse). Report included the following information SBAR, Procedure Summary, Intake/Output and MAR.

## 2020-10-28 NOTE — PROGRESS NOTES
Post-Operative Day Number 1 Progress Note    Patient doing well post-op day 1 from  delivery without significant complaints. Pain controlled on current medication. Voiding without difficulty, normal lochia. Vitals:    Patient Vitals for the past 8 hrs:   BP Temp Pulse Resp   10/28/20 0400 (!) 120/56 98.3 °F (36.8 °C) 88 18   10/28/20 0015 (!) 107/54 98.4 °F (36.9 °C) 96 18     Temp (24hrs), Av.2 °F (36.8 °C), Min:97.9 °F (36.6 °C), Max:98.4 °F (36.9 °C)      Vital signs stable, afebrile. Exam:  Patient without distress. Abdomen soft, fundus firm at level of umbilicus, non tender. Incision dry and clean without erythema. Lower extremities are negative for swelling, cords or tenderness. Lab/Data Review:  BMP: No results found for: NA, K, CL, CO2, AGAP, GLU, BUN, CREA, GFRAA, GFRNA  CMP: No results found for: NA, K, CL, CO2, AGAP, GLU, BUN, CREA, GFRAA, GFRNA, CA, MG, PHOS, ALB, TBIL, TP, ALB, GLOB, AGRAT, ALT  CBC:   Lab Results   Component Value Date/Time    WBC 11.8 (H) 10/28/2020 03:58 AM    HGB 8.0 (L) 10/28/2020 03:58 AM    HCT 26.3 (L) 10/28/2020 03:58 AM     10/28/2020 03:58 AM       Assessment and Plan:  Patient appears to be having uncomplicated post- course. Continue routine post-op care and maternal education.

## 2020-10-29 ENCOUNTER — HOSPITAL ENCOUNTER (OUTPATIENT)
Dept: PREADMISSION TESTING | Age: 20
Discharge: HOME | End: 2020-10-29

## 2020-10-29 VITALS
BODY MASS INDEX: 53.92 KG/M2 | HEIGHT: 62 IN | WEIGHT: 293 LBS | OXYGEN SATURATION: 98 % | SYSTOLIC BLOOD PRESSURE: 127 MMHG | TEMPERATURE: 98.2 F | HEART RATE: 103 BPM | DIASTOLIC BLOOD PRESSURE: 77 MMHG | RESPIRATION RATE: 18 BRPM

## 2020-10-29 PROCEDURE — 90715 TDAP VACCINE 7 YRS/> IM: CPT | Performed by: SPECIALIST

## 2020-10-29 PROCEDURE — 74011250636 HC RX REV CODE- 250/636: Performed by: SPECIALIST

## 2020-10-29 PROCEDURE — 74011250637 HC RX REV CODE- 250/637: Performed by: SPECIALIST

## 2020-10-29 RX ORDER — IBUPROFEN 800 MG/1
800 TABLET ORAL
Qty: 30 TAB | Refills: 0 | Status: ON HOLD | OUTPATIENT
Start: 2020-10-29 | End: 2021-09-03

## 2020-10-29 RX ORDER — OXYCODONE AND ACETAMINOPHEN 5; 325 MG/1; MG/1
1 TABLET ORAL
Qty: 15 TAB | Refills: 0 | Status: SHIPPED | OUTPATIENT
Start: 2020-10-29 | End: 2020-11-01

## 2020-10-29 RX ADMIN — Medication 80 MG: at 09:51

## 2020-10-29 RX ADMIN — ACETAMINOPHEN 650 MG: 325 TABLET ORAL at 00:33

## 2020-10-29 RX ADMIN — TETANUS TOXOID, REDUCED DIPHTHERIA TOXOID AND ACELLULAR PERTUSSIS VACCINE, ADSORBED 0.5 ML: 5; 2.5; 8; 8; 2.5 SUSPENSION INTRAMUSCULAR at 10:49

## 2020-10-29 RX ADMIN — IBUPROFEN 800 MG: 400 TABLET, FILM COATED ORAL at 08:58

## 2020-10-29 NOTE — DISCHARGE INSTRUCTIONS
POSTPARTUM DISCHARGE INSTRUCTIONS       Name:  Gio Hernandez  YOB: 2000  Admission Diagnosis:  Pregnancy [Z34.90]     Discharge Diagnosis:    Problem List as of 10/29/2020 Date Reviewed: 10/27/2020          Codes Class Noted - Resolved    Pregnancy ICD-10-CM: Z34.90  ICD-9-CM: V22.2  10/26/2020 - Present        Pregnant ICD-10-CM: Z34.90  ICD-9-CM: V22.2  2017 - Present        Abdominal pain ICD-10-CM: R10.9  ICD-9-CM: 789.00  2017 - Present        RESOLVED: Pregnancy induced hypertension ICD-10-CM: O13.9  ICD-9-CM: 642.30  2020 - 2020            Attending Physician:  Ashley Jorge MD    Delivery Type:   Section: What to Expect at Home    Your Recovery:  A  section, or , is surgery to deliver your baby through a cut, called an incision that the doctor makes in your lower belly and uterus. You may have some pain in your lower belly and need pain medicine for 1 to 2 weeks. You can expect some vaginal bleeding for several weeks. You will probably need about 6 weeks to fully recover. It is important to take it easy while the incision is healing. Avoid heavy lifting, strenuous activities, or exercises that strain the belly muscles while you are recovering. Ask a family member or friend for help with housework, cooking, and shopping. This care sheet gives you a general idea about how long it will take for you to recover. But each person recovers at a different pace. Follow the steps below to get better as quickly as possible. How can you care for yourself at home? Activity  · Rest when you feel tired. Getting enough sleep will help you recover. · Try to walk each day. Start by walking a little more than you did the day before. Bit by bit, increase the amount you walk. Walking boosts blood flow and helps prevent pneumonia, constipation, and blood clots.   · Avoid strenuous activities, such as bicycle riding, jogging, weightlifting, and aerobic exercise, for 6 weeks or until your doctor says it is okay. · Until your doctor says it is okay, do not lift anything heavier than your baby. · Do not do sit-ups or other exercise that strain the belly muscles for 6 weeks or until your doctor says it is okay. · Hold a pillow over your incision when you cough or take deep breaths. This will support your belly and decrease your pain. · You may shower as usual. Pat the incision dry when you are done. · You will have some vaginal bleeding. Wear sanitary pads. Do not douche or use tampons until your doctor says it is okay. · Ask your doctor when you can drive again. · You will probably need to take at least 6 weeks off work. It depends on the type of work you do and how you feel. · Wait until you are healed (about 4 to 6 weeks) before you have sexual intercourse. Your doctor will tell you when it is okay to have sex. · Talk to your doctor about birth control. You can get pregnant even before your period returns. Also, you can get pregnant while you are breast-feeding. Incision care  Your skin is your body's first line of defense against germs, but an incision site leaves an easy way for germs to enter your body. To prevent infection:  · Clean your hands frequently and before and after changing any touching any dressings. · If you have strips of tape on the incision, leave the tape on for a week or until it falls off. · Look at your incision closely every day for any changes. Contact your doctor if you experience any signs of infection, such as fever or increased redness at the surgical site. · Wash the area daily with warm, soapy water, and pat it dry. Don't use hydrogen peroxide or alcohol, which can slow healing. You may cover the area with a gauze bandage if it weeps or rubs against clothing. Change the bandage every day. · Keep the area clean and dry. Diet  · You can eat your normal diet.  If your stomach is upset, try bland, low-fat foods like plain rice, broiled chicken, toast, and yogurt. · Drink plenty of fluids (unless your doctor tells you not to). · You may notice that your bowel movements are not regular right after your surgery. This is common. Try to avoid constipation and straining with bowel movements. You may want to take a fiber supplement every day. If you have not had a bowel movement after a couple of days, ask your doctor about taking a mild laxative. · If you are breast-feeding, do not drink any alcohol. Medicines  · Take pain medicines exactly as directed. · If the doctor gave you a prescription medicine for pain, take it as prescribed. · If you are not taking a prescription pain medicine, ask your doctor if you can take an over-the-counter medicine such as acetaminophen (Tylenol), ibuprofen (Advil, Motrin), or naproxen (Aleve), for cramps. Read and follow all instructions on the label. Do not take aspirin, because it can cause more bleeding. Do not take acetaminophen (Tylenol) and other acetaminophen containing medications (i.e. Percocet) at the same time. · If you think your pain medicine is making you sick to your stomach:  · Take your medicine after meals (unless your doctor has told you not to). · Ask your doctor for a different pain medicine. · If your doctor prescribed antibiotics, take them as directed. Do not stop taking them just because you feel better. You need to take the full course of antibiotics. Mental Health  · Many women get the \"baby blues\" during the first few days after childbirth. You may lose sleep, feel irritable, and cry easily. You may feel happy one minute and sad the next. Hormone changes are one cause of these emotional changes. Also, the demands of a new baby, along with visits from relatives or other family needs, add to a mother's stress. The \"baby blues\" often peak around the fourth day. Then they ease up in less than 2 weeks.   · If your moodiness or anxiety lasts for more than 2 weeks, or if you feel like life is not worth living, you may have postpartum depression. This is different for each mother. Some mothers with serious depression may worry intensely about their infant's well-being. Others may feel distant from their child. Some mothers might even feel that they might harm their baby. A mother may have signs of paranoia, wondering if someone is watching her. · With all the changes in your life, you may not know if you are depressed. Pregnancy sometimes causes changes in how you feel that are similar to the symptoms of depression. · Symptoms of depression include:  · Feeling sad or hopeless and losing interest in daily activities. These are the most common symptoms of depression. · Sleeping too much or not enough. · Feeling tired. You may feel as if you have no energy. · Eating too much or too little. · POSTPARTUM SUPPORT INTERNATIONAL (PSI) offers a Warm line; Chat with the Expert phone sessions; Information and Articles about Pregnancy and Postpartum Mood Disorders; Comprehensive List of Free Support Groups; Knowledgeable local coordinators who will offer support, information, and resources; Guide to Resources on [employer]; Calendar of events in the  mood disorders community; Latest News and Research; and [facility] [address] for [employer]. Remember - You are not alone; You are not to blame; With help, you will be well. 0-629-219-PPD(9888). WWW. POSTPARTUM. NET   · Writing or talking about death, such as writing suicide notes or talking about guns, knives, or pills. Keep the numbers for these national suicide hotlines: 6-169-772-TALK (6-718-279-453-583-0946) and 5-000-KMOMBNI (3-620.284.2994). If you or someone you know talks about suicide or feeling hopeless, get help right away. Other instructions  · If you breast-feed your baby, you may be more comfortable while you are healing if you place the baby so that he or she is not resting on your belly.  Try tucking your baby under your arm, with his or her body along the side you will be feeding on. Support your baby's upper body with your arm. With that hand you can control your baby's head to bring his or her mouth to your breast. This is sometimes called the football hold. Follow-up care is a key part of your treatment and safety. Be sure to make and go to all appointments, and call your doctor if you are having problems. It's also a good idea to know your test results and keep a list of the medicines you take. When should you call for help? Call 911 anytime you think you may need emergency care. For example, call if:  · You are thinking of hurting yourself, your baby, or anyone else. · You passed out (lost consciousness). · You have symptoms of a blood clot in your lung (called a pulmonary embolism). These may include:  · Sudden chest pain. · Trouble breathing. · Coughing up blood. Call your doctor now or seek immediate medical care if:    · You have severe vaginal bleeding. · You are soaking through a pad each hour for 2 or more hours. · Your vaginal bleeding seems to be getting heavier or is still bright red 4 days after delivery. · You are dizzy or lightheaded, or you feel like you may faint. · You are vomiting or cannot keep fluids down. · You have a fever. · You have new or more belly pain. · You have loose stitches, or your incision comes open. · You have symptoms of infection, such as:  · Increased pain, swelling, warmth, or redness. · Red streaks leading from the incision. · Pus draining from the incision. · A fever  · You pass tissue (not just blood). · Your vaginal discharge smells bad. · Your belly feels tender or full and hard. · Your breasts are continuously painful or red. · You feel sad, anxious, or hopeless for more than a few days. · You have sudden, severe pain in your belly.   · You have symptoms of a blood clot in your leg (called a deep vein thrombosis), such as:  · Pain in your calf, back of the knee, thigh, or groin. · Redness and swelling in your leg or groin. · You have symptoms of preeclampsia, such as:  · Sudden swelling of your face, hands, or feet. · New vision problems (such as dimness or blurring). · A severe headache. · Your blood pressure is higher than it should be or rises suddenly. · You have new nausea or vomiting. Watch closely for changes in your health, and be sure to contact your doctor if you have any problems. Additional Information:  Preventing Infection at Home    We care about preventing infection and avoiding the spread of germs - not only when you are in the hospital but also when you return home. When you return home from the hospital, it's important to take the following steps to help prevent infection and avoid spreading germs that could infect you and others. Ask everyone in your home to follow these guidelines, too. Clean Your Hands  · Clean your hands whenever your hands are visibly dirty, before you eat, before or after touching your mouth, nose or eyes, and before preparing food. Clean them after contact with body fluids, using the restroom, touching animals or changing diapers. · When washing hands, wet them with warm water and work up a lather. Rub hands for at least 15 seconds, then rinse them and pat them dry with a clean towel or paper towel. · When using hand sanitizers, it should take about 15 seconds to rub your hands dry. If not, you probably didn't apply enough . Cover Your Sneeze or Cough  Germs are released into the air whenever you sneeze or cough. To prevent the spread of infection:  · Turn away from other people before coughing or sneezing. · Cover your mouth or nose with a tissue when you cough or sneeze. Put the tissue in the trash. · If you don't have a tissue, cough or sneeze into your upper sleeve, not your hands. · Always clean your hands after coughing or sneezing.     Care for Wounds  Your skin is your body's first line of defense against germs, but an open wound leaves an easy way for germs to enter your body. To prevent infection:  · Clean your hands before and after changing wound dressings, and wear gloves to change dressings if recommended by your doctor. · Take special care with IV lines or other devices inserted into the body. If you must touch them, clean your hands first.  · Follow any specific instructions from your doctor to care for your wounds. Contact your doctor if you experience any signs of infection, such as fever or increased redness at the surgical or wound site. Keep a Clean Home  · Clean or wipe commonly touched hard surfaces like door handles, sinks, tabletops, phones and TV remotes. · Use products labeled \"disinfectant\" to kill harmful bacteria and viruses. · Use a clean cloth or paper towel to clean and dry surfaces. Wiping surfaces with a dirty dishcloth, sponge or towel will only spread germs. · Never share toothbrushes, crowell, drinking glasses, utensils, razor blades, face cloths or bath towels to avoid spreading germs. · Be sure that the linens that you sleep on are clean. · Keep pets away from wounds and wash your hands after touching pets, their toys or bedding. We care about you and your health. Remember, preventing infections is a   team effort between you, your family, friends and health care providers. Postpartum Support    PARENTS:  Are you feeling sad or depressed? Is it difficult for you to enjoy yourself? Do you feel more irritable or tense? Do you feel anxious or panicky? Are you having difficulty bonding with your baby? Do you feel as if you are \"out of control\" or \"going crazy\"? Are you worried that you might hurt your baby or yourself? FAMILIES: Do you worry that something is wrong but don't know how to help? Do you think that your partner or spouse is having problems coping? Are you worried that it may never get better? While many women experience some mild mood change or \"the blues\" during or after the birth of a child, 1 in 9 women experience more significant symptoms of depression or anxiety. 1 in 10 Dads become depressed during the first year. Things you can do  Being a good parent includes taking care of yourself. If you take care of yourself, you will be able to take better care of your baby and your family. · Talk to a counselor or healthcare provider who has training in  mood and anxiety problems. · Learn as much as you can about pregnancy and postpartum depression and anxiety. · Get support from family and friends. Ask for help when you need it. · Join a support group in your area or online. · Keep active by walking, stretching or whatever form of exercise helps you to feel better. · Get enough rest and time for yourself. · Eat a healthy diet. · Don't give up! It may take more than one try to get the right help you need. These are general instructions for a healthy lifestyle:    No smoking/ No tobacco products/ Avoid exposure to second hand smoke    Surgeon General's Warning:  Quitting smoking now greatly reduces serious risk to your health. Obesity, smoking, and sedentary lifestyle greatly increases your risk for illness    A healthy diet, regular physical exercise & weight monitoring are important for maintaining a healthy lifestyle    Recognize signs and symptoms of STROKE:    F-face looks uneven    A-arms unable to move or move unevenly    S-speech slurred or non-existent    T-time-call 911 as soon as signs and symptoms begin - DO NOT go       back to bed or wait to see if you get better - TIME IS BRAIN. I have had the opportunity to make my options or choices for discharge. I have received and understand these instructions. Remember The Member Activation    Thank you for requesting access to Remember The Member.  Please follow the instructions below to securely access and download your online medical record. [employer]use allows you to send messages to your doctor, view your test results, renew your prescriptions, schedule appointments, and more. How Do I Sign Up? 1. In your internet browser, go to [email or web address]. 2. Click on the First Time User? Click Here link in the Sign In box. You will see the New Member Sign Up page. 3. Enter your M[employer]use Access Code exactly as it appears below. You will not need to use this code after youve completed the sign-up process. If you do not sign up before the expiration date, you must request a new code. Mfuse Access Code: [email or web address]  Expires: 2020  4:04 PM (This is the date your M[employer]use access code will )    4. Enter the last four digits of your Social Security Number (xxxx) and Date of Birth (mm/dd/yyyy) as indicated and click Submit. You will be taken to the next sign-up page. 5. Create a Mfuse ID. This will be your Mfuse login ID and cannot be changed, so think of one that is secure and easy to remember. 6. Create a Mfuse password. You can change your password at any time. 7. Enter your Password Reset Question and Answer. This can be used at a later time if you forget your password. 8. Enter your e-mail address. You will receive e-mail notification when new information is available in [address] 9. Click Sign Up. You can now view and download portions of your medical record. 10. Click the Download Summary menu link to download a portable copy of your medical information. Additional Information    If you have questions, please visit the Frequently Asked Questions section of the Mfuse website at [email or web address] Remember, M[employer]use is NOT to be used for urgent needs. For medical emergencies, dial 911.

## 2020-10-29 NOTE — PROGRESS NOTES
Post-Operative  Day 2   [address] for the patient's :  Jaleesa Gasca [[id number]]   , Low Transverse    Patient doing well without significant complaint. Nausea and vomiting resolved, tolerating liquids, passing flatus, voiding and ambulating without difficulty. Vitals:    Visit Vitals  /79 (BP 1 Location: Left arm, BP Patient Position: At rest)   Pulse (!) 107   Temp 98.7 °F (37.1 °C)   Resp 18   Ht 5' 2\" (1.575 m)   Wt 139.3 kg (307 lb)   SpO2 98%   Breastfeeding Yes   BMI 56.15 kg/m²     Temp (24hrs), Av.5 °F (36.9 °C), Min:98 °F (36.7 °C), Max:99.5 °F (37.5 °C)        Exam:        Patient without distress. Abdomen, bowel sounds present, soft, expected tenderness, fundus firm                Wound incision clean, dry and intact               Lower extremities are negative for swelling, cords or tenderness.     Labs:   Lab Results   Component Value Date/Time    WBC 11.8 (H) 10/28/2020 03:58 AM    WBC 15.7 (H) 10/26/2020 09:06 PM    WBC 13.3 (H) 10/12/2020 08:42 PM    WBC 9.6 2020 07:25 AM    WBC 14.4 (H) 2020 03:48 PM    WBC 15.9 (H) 2020 09:51 PM    WBC 15.3 (H) 09/15/2017 03:26 AM    WBC 15.3 (H) 2017 04:47 PM    WBC 12.7 (H) 2017 12:45 AM    HGB 8.0 (L) 10/28/2020 03:58 AM    HGB 9.8 (L) 10/26/2020 09:06 PM    HGB 10.5 (L) 10/12/2020 08:42 PM    HGB 9.8 (L) 2020 07:25 AM    HGB 10.4 (L) 2020 03:48 PM    HGB 10.9 (L) 2020 09:51 PM    HGB 9.4 (L) 09/15/2017 03:26 AM    HGB 11.2 (L) 2017 04:47 PM    HGB 10.6 (L) 2017 12:45 AM    HCT 26.3 (L) 10/28/2020 03:58 AM    HCT 31.7 (L) 10/26/2020 09:06 PM    HCT 32.5 (L) 10/12/2020 08:42 PM    HCT 30.9 (L) 2020 07:25 AM    HCT 32.6 (L) 2020 03:48 PM    HCT 34.0 (L) 2020 09:51 PM    HCT 28.3 (L) 09/15/2017 03:26 AM    HCT 33.7 (L) 2017 04:47 PM    HCT 32.4 (L) 2017 12:45 AM    PLATELET 858  03:58 AM PLATELET 700 05/64/3922 09:06 PM    PLATELET 198 10/09/7601 08:42 PM    PLATELET 800 98/47/2700 07:25 AM    PLATELET 369 82/98/7950 03:48 PM    PLATELET 599 52/22/6470 09:51 PM    PLATELET 555 58/07/9354 03:26 AM    PLATELET 162 95/50/0787 04:47 PM    PLATELET 076 27/46/0191 12:45 AM       No results found for this or any previous visit (from the past 24 hour(s)). Assessment: Post-Op day 2, doing well      Plan:   1.  Routine post-operative care

## 2020-10-29 NOTE — DISCHARGE SUMMARY
Obstetrical Discharge Summary     Name: Amando Samuel MRN: [id number]  SSN: [id number]    YOB: 2000  Age: 23 y.o. Sex: female      Allergies: Patient has no known allergies. Admit Date: 10/26/2020    Discharge Date: 10/29/2020     Admitting Physician: Victoria Raphael MD     Attending Physician:  Tillie Cockayne, MD     * Admission Diagnoses: Pregnancy [Z34.90]    * Discharge Diagnoses:   Information for the patient's :  Rita Horse [[id number]]   Delivery of a 4.24 kg male infant via , Low Transverse on 10/27/2020 at 9:09 AM  by Victoria Raphael. Apgars were 9  and 9 . Additional Diagnoses:   Hospital Problems as of 10/29/2020 Date Reviewed: 10/27/2020          Codes Class Noted - Resolved POA    Pregnancy ICD-10-CM: Z34.90  ICD-9-CM: V22.2  10/26/2020 - Present Unknown             Lab Results   Component Value Date/Time    ABO/Rh(D) A POSITIVE 10/26/2020 09:06 PM    Rubella, External 2.54 - Immune 2020    GrBStrep, External Negative 2017    ABO,Rh A pos 2017      Immunization History   Administered Date(s) Administered    Tdap 2017       * Procedures:   Procedure(s):   SECTION           * Discharge Condition: stable    * Hospital Course: Normal hospital course following the delivery. * Disposition: Home    Discharge Medications:   Current Discharge Medication List      START taking these medications    Details   ibuprofen (MOTRIN) 800 mg tablet Take 1 Tab by mouth every eight (8) hours as needed for Pain. Qty: 30 Tab, Refills: 0      oxyCODONE-acetaminophen (PERCOCET) 5-325 mg per tablet Take 1 Tab by mouth every four (4) hours as needed for Pain for up to 3 days. Max Daily Amount: 6 Tabs. Qty: 15 Tab, Refills: 0    Associated Diagnoses: S/P  section         CONTINUE these medications which have NOT CHANGED    Details   acetaminophen (TYLENOL) 325 mg tablet Take 650 mg by mouth every four (4) hours as needed for Pain. PNV66-Iron Fumarate-FA-DSS-DHA 26-1.2- mg cap Take  by mouth. * Follow-up Care/Patient Instructions:   Activity: Activity as tolerated, No sex for 4-6 weeks and No heavy lifting for 4-6 weeks  Diet: Regular Diet  Wound Care: Keep wound clean and dry  Postpartum warning signs discussed  Motrin, tylenol, and percocet as needed for pain    Follow-up Information     Follow up With Specialties Details Why Contact Info    Unknown, Provider    Patient not available to ask      Lou Ibrahim MD Obstetrics & Gynecology, Gynecology, Obstetrics In 1 week  [address]  [phone]

## 2020-10-29 NOTE — PROGRESS NOTES
Bedside shift change report given to C. Darryle Edinger, RN (oncoming nurse) by JIM Johnson RN (offgoing nurse). Report included the following information SBAR.

## 2021-09-02 ENCOUNTER — HOSPITAL ENCOUNTER (EMERGENCY)
Age: 21
Discharge: HOME | End: 2021-09-02
Attending: SPECIALIST | Admitting: SPECIALIST
Payer: MEDICAID

## 2021-09-02 PROCEDURE — 96372 THER/PROPH/DIAG INJ SC/IM: CPT

## 2021-09-02 PROCEDURE — 74011250636 HC RX REV CODE- 250/636: Performed by: SPECIALIST

## 2021-09-02 PROCEDURE — 99281 EMR DPT VST MAYX REQ PHY/QHP: CPT

## 2021-09-02 RX ORDER — BETAMETHASONE SODIUM PHOSPHATE AND BETAMETHASONE ACETATE 3; 3 MG/ML; MG/ML
12 INJECTION, SUSPENSION INTRA-ARTICULAR; INTRALESIONAL; INTRAMUSCULAR; SOFT TISSUE ONCE
Status: COMPLETED | OUTPATIENT
Start: 2021-09-02 | End: 2021-09-02

## 2021-09-02 RX ADMIN — BETAMETHASONE SODIUM PHOSPHATE AND BETAMETHASONE ACETATE 12 MG: 3; 3 INJECTION, SUSPENSION INTRA-ARTICULAR; INTRALESIONAL; INTRAMUSCULAR at 12:46

## 2021-09-02 NOTE — DISCHARGE INSTRUCTIONS
General Discharge Instructions    Patient ID:  Yvan Pike  [id number]  9 y.o.  2000    Patient Instructions      Take Home Medications       What to do at Home    Recommended diet: Regular Diet    Recommended activity: Activity as tolerated    Follow-up: return to L&D on 9/3/21 at 1230

## 2021-09-02 NOTE — PROGRESS NOTES
1240: pt arrived for betamethasone shot only per Dr. Brando Gibson then pt can be discharged and will return tomorrow for 2nd shot. 1249: pt given d/c instructions.

## 2021-09-03 ENCOUNTER — HOSPITAL ENCOUNTER (OUTPATIENT)
Age: 21
Discharge: HOME | End: 2021-09-03
Attending: SPECIALIST | Admitting: SPECIALIST
Payer: MEDICAID

## 2021-09-03 VITALS — HEIGHT: 62 IN | BODY MASS INDEX: 53.92 KG/M2 | WEIGHT: 293 LBS

## 2021-09-03 PROCEDURE — 74011250636 HC RX REV CODE- 250/636: Performed by: SPECIALIST

## 2021-09-03 PROCEDURE — 96372 THER/PROPH/DIAG INJ SC/IM: CPT

## 2021-09-03 RX ORDER — BETAMETHASONE SODIUM PHOSPHATE AND BETAMETHASONE ACETATE 3; 3 MG/ML; MG/ML
12 INJECTION, SUSPENSION INTRA-ARTICULAR; INTRALESIONAL; INTRAMUSCULAR; SOFT TISSUE
Status: COMPLETED | OUTPATIENT
Start: 2021-09-03 | End: 2021-09-03

## 2021-09-03 RX ORDER — NITROFURANTOIN 25; 75 MG/1; MG/1
100 CAPSULE ORAL DAILY
COMMUNITY

## 2021-09-03 RX ADMIN — BETAMETHASONE SODIUM PHOSPHATE AND BETAMETHASONE ACETATE 12 MG: 3; 3 INJECTION, SUSPENSION INTRA-ARTICULAR; INTRALESIONAL; INTRAMUSCULAR at 12:50

## 2021-09-03 NOTE — DISCHARGE INSTRUCTIONS
Patient Education   Betamethasone (By injection)   Betamethasone (bay-ta-METH-a-sone)  Treats inflammation and many other medical problems. This medicine is a corticosteroid. Brand Name(s):   There may be other brand names for this medicine. When This Medicine Should Not Be Used: This medicine is not right for everyone. Do not use it if you had an allergic reaction to betamethasone or if you have a fungal infection. How to Use This Medicine:   Injectable  · Your doctor will prescribe your exact dose and tell you how often it should be given. This medicine is given as a shot into a muscle, a vein, a joint such as a knee or shoulder, or a lesion on your skin. · A nurse or other health provider will give you this medicine. · Missed dose: You must use this medicine on a fixed schedule. Call your doctor or pharmacist if you miss a dose. Drugs and Foods to Avoid:   Ask your doctor or pharmacist before using any other medicine, including over-the-counter medicines, vitamins, and herbal products. · Some medicines can affect how betamethasone works. Tell your doctor if you are taking any of the following:   ¨ Aminoglutethimide, carbamazepine, cholestyramine, cyclosporine, ketoconazole, phenytoin, rifampin  ¨ Antibiotics  ¨ Barbiturates  ¨ Blood thinner, such as warfarin  ¨ Diabetes medicine  ¨ Diuretic (water pill)  ¨ Estrogen or birth control pills  ¨ NSAID pain or arthritis medicine, such as aspirin, diclofenac, ibuprofen, naproxen, celecoxib  ¨ Other steroids, such as hydrocortisone, methylprednisolone, prednisone  ¨ Tuberculosis (TB) medicine  · This medicine may interfere with vaccines. Ask your doctor before you get a flu shot or any other vaccines. Warnings While Using This Medicine:   · Tell your doctor if you are pregnant or breastfeeding. · This medicine may cause problems if you have certain medical conditions.  Tell your doctor about any allergies, medical problems, or mental conditions you now have or had in the past.  · Do not stop using this medicine suddenly. Your doctor will need to slowly decrease your dose before you stop it completely. · Tell your doctor if you have any events causing unusual stress or anxiety in your life. Your dose may need to be changed. · It may be easier for you to get an infection while you are taking this medicine. Tell your doctor right away if you are exposed to chickenpox or measles. · Tell any doctor or dentist who treats you that you are receiving betamethasone. Possible Side Effects While Using This Medicine:   Call your doctor right away if you notice any of these side effects:  · Allergic reaction: Itching or hives, swelling in your face or hands, swelling or tingling in your mouth or throat, chest tightness, trouble breathing  · Black or tarry stools  · Headache or blurred vision  · Increased urination or thirst  · Muscle weakness or cramps  · Pain in your throat or trouble swallowing  · Seizures  · Unexplained fever, sore throat  If you notice these less serious side effects, talk with your doctor:   · Easy bruising, small purple spots on your skin, redness of the face  · Increased hair growth  · Increased sweating  · Weight gain, swelling in your hands, arms, feet, or legs  If you notice other side effects that you think are caused by this medicine, tell your doctor. Call your doctor for medical advice about side effects. You may report side effects to FDA at 7-070-FDA-9627  © 2017 [facility] Information is for End User's use only and may not be sold, redistributed or otherwise used for commercial purposes. The above information is an  only. It is not intended as medical advice for individual conditions or treatments. Talk to your doctor, nurse or pharmacist before following any medical regimen to see if it is safe and effective for you.

## 2021-09-03 NOTE — PROGRESS NOTES
Caren Valdovinos is a 24 y.o.  [place]  at 35w4d patient of Dr. Brando Gibson who presents to L&D triage for scheduled betamethasone injection. This is her 2nd dose. She reports positive FM, denies LOF, VB, ctx. She reports HA, RUQ pain, edema. Denies Scotoma. States that Dr Brando Gibson is aware of her symptoms and following for preeclampsia. 1250 - 12 mg betamethasone given IM in left gluteus. Discharge papers reviewed and copy of AVS given.     1252 - pt ambulated off unit in no distress

## 2022-02-24 ENCOUNTER — NURSE TRIAGE (OUTPATIENT)
Dept: OTHER | Facility: CLINIC | Age: 22
End: 2022-02-24

## 2022-02-24 NOTE — TELEPHONE ENCOUNTER
Received call from Kristofer at [facility] with Red Flag Complaint. Subjective: Caller states \"htn\"     Current Symptoms: htn 145/102 recheck 135/98, h/a, numbness in hands, also blurred vision and dizziness, had a baby 5 months ago, all these symptoms are mild today and have been coming and going, had these symptoms during her pregnancy also no hx htn in the past    Onset:  month    Associated Symptoms: NA    Pain Severity: mild     Temperature:  none    What has been tried: nothing    LMP: last month Pregnant: NA    Recommended disposition: BE SEEN IN 3 DAYS if unable to go to [facility]    Care advice provided, patient verbalizes understanding; denies any other questions or concerns; instructed to call back for any new or worsening symptoms. Patient/Caller agrees with recommended disposition; writer provided warm transfer to [facility] for appointment scheduling    Attention Provider: Thank you for allowing me to participate in the care of your patient. The patient was connected to triage in response to information provided to the [facility].   Please do not respond through this encounter as the response is not directed to a     Reason for Disposition   Systolic BP >= 910 OR Diastolic >= 531    Protocols used: BLOOD PRESSURE - HIGH-ADULT-OH

## 2022-03-19 PROBLEM — Z34.90 PREGNANCY: Status: ACTIVE | Noted: 2020-10-26

## 2022-03-19 PROBLEM — R10.9 ABDOMINAL PAIN: Status: ACTIVE | Noted: 2017-08-09

## 2022-03-19 PROBLEM — Z34.90 PREGNANT: Status: ACTIVE | Noted: 2017-09-14

## 2023-12-26 NOTE — ANESTHESIA POSTPROCEDURE EVALUATION
Post-Anesthesia Evaluation and Assessment    Patient: Vickie Hinojosa MRN: [id number]  SSN: [id number]    YOB: 2000  Age: 24 y.o. Sex: female       Cardiovascular Function/Vital Signs  Visit Vitals    /77    Pulse 109    Temp 36.7 °C (98 °F)    Resp 27    Ht 5' 2\" (1.575 m)    Wt 137.4 kg (303 lb)    SpO2 100%    Breastfeeding Yes    BMI 55.42 kg/m2       Patient is status post spinal anesthesia for Procedure(s):   SECTION. Nausea/Vomiting: None    Postoperative hydration reviewed and adequate. Pain:  Pain Scale 1: Numeric (0 - 10) (17 170)  Pain Intensity 1: 4 (17)   Managed    Neurological Status: At baseline    Mental Status and Level of Consciousness: Arousable    Pulmonary Status:   O2 Device: CO2 nasal cannula (17 182)   Adequate oxygenation and airway patent    Complications related to anesthesia: None    Post-anesthesia assessment completed.  No concerns    Signed By: Merline Danish, MD     2017 169.9

## (undated) DEVICE — SOLUTION IV 1000ML 0.9% SOD CHL

## (undated) DEVICE — PENCIL ES L3M BTTN SWCH S STL HEX LOK BLDE ELECTRD HOLSTER

## (undated) DEVICE — SUTURE VCRL SZ 1 L36IN ABSRB VLT L48MM CTX 1/2 CIR J371H

## (undated) DEVICE — MEDI-VAC NON-CONDUCTIVE SUCTION TUBING: Brand: CARDINAL HEALTH

## (undated) DEVICE — 3000CC GUARDIAN II: Brand: GUARDIAN

## (undated) DEVICE — PAD GRND VALLEY LAB

## (undated) DEVICE — PACK PROCEDURE SURG C SECT KT SMH

## (undated) DEVICE — SPONGE COUNTING BAG: Brand: DEVON

## (undated) DEVICE — SOLIDIFIER MEDC 1200ML -- CONVERT TO 356117

## (undated) DEVICE — SUTURE VCRL SZ 0 L36IN ABSRB VLT L40MM CT 1/2 CIR J358H

## (undated) DEVICE — (D)PREP SKN CHLRAPRP APPL 26ML -- CONVERT TO ITEM 371833

## (undated) DEVICE — DEVON™ KNEE AND BODY STRAP 60" X 3" (1.5 M X 7.6 CM): Brand: DEVON

## (undated) DEVICE — TIP CLEANER: Brand: VALLEYLAB

## (undated) DEVICE — (D)GLOVE SURG PROTEITY 7.5 LTX -- SUB 302984

## (undated) DEVICE — STAPLER SKIN SQ 30 ABSRB STPL DISP INSORB

## (undated) DEVICE — REM POLYHESIVE ADULT PATIENT RETURN ELECTRODE: Brand: VALLEYLAB